# Patient Record
Sex: FEMALE | Race: WHITE | NOT HISPANIC OR LATINO
[De-identification: names, ages, dates, MRNs, and addresses within clinical notes are randomized per-mention and may not be internally consistent; named-entity substitution may affect disease eponyms.]

---

## 2017-01-23 ENCOUNTER — RX RENEWAL (OUTPATIENT)
Age: 72
End: 2017-01-23

## 2017-04-17 ENCOUNTER — APPOINTMENT (OUTPATIENT)
Dept: FAMILY MEDICINE | Facility: CLINIC | Age: 72
End: 2017-04-17

## 2017-04-17 ENCOUNTER — LABORATORY RESULT (OUTPATIENT)
Age: 72
End: 2017-04-17

## 2017-04-17 VITALS
DIASTOLIC BLOOD PRESSURE: 82 MMHG | BODY MASS INDEX: 34.1 KG/M2 | SYSTOLIC BLOOD PRESSURE: 124 MMHG | WEIGHT: 225 LBS | HEIGHT: 68 IN

## 2017-04-18 LAB
ALBUMIN SERPL ELPH-MCNC: 4.3 G/DL
ALP BLD-CCNC: 72 U/L
ALT SERPL-CCNC: 20 U/L
ANION GAP SERPL CALC-SCNC: 15 MMOL/L
AST SERPL-CCNC: 26 U/L
B BURGDOR IGG+IGM SER QL IB: NORMAL
BILIRUB SERPL-MCNC: 0.3 MG/DL
BUN SERPL-MCNC: 16 MG/DL
CALCIUM SERPL-MCNC: 9.7 MG/DL
CHLORIDE SERPL-SCNC: 102 MMOL/L
CHOLEST SERPL-MCNC: 188 MG/DL
CHOLEST/HDLC SERPL: 2.9 RATIO
CO2 SERPL-SCNC: 25 MMOL/L
CREAT SERPL-MCNC: 0.93 MG/DL
GLUCOSE SERPL-MCNC: 93 MG/DL
HBA1C MFR BLD HPLC: 5.6 %
HDLC SERPL-MCNC: 65 MG/DL
LDLC SERPL CALC-MCNC: 106 MG/DL
POTASSIUM SERPL-SCNC: 4.4 MMOL/L
PROT SERPL-MCNC: 6.9 G/DL
SODIUM SERPL-SCNC: 142 MMOL/L
TRIGL SERPL-MCNC: 85 MG/DL
TSH SERPL-ACNC: 1.14 UIU/ML

## 2017-04-24 ENCOUNTER — RX RENEWAL (OUTPATIENT)
Age: 72
End: 2017-04-24

## 2017-07-03 ENCOUNTER — RX RENEWAL (OUTPATIENT)
Age: 72
End: 2017-07-03

## 2017-07-06 ENCOUNTER — RX RENEWAL (OUTPATIENT)
Age: 72
End: 2017-07-06

## 2017-11-02 ENCOUNTER — RX RENEWAL (OUTPATIENT)
Age: 72
End: 2017-11-02

## 2018-01-18 ENCOUNTER — RX RENEWAL (OUTPATIENT)
Age: 73
End: 2018-01-18

## 2018-02-14 ENCOUNTER — RX RENEWAL (OUTPATIENT)
Age: 73
End: 2018-02-14

## 2018-02-15 ENCOUNTER — RX RENEWAL (OUTPATIENT)
Age: 73
End: 2018-02-15

## 2018-04-20 ENCOUNTER — RX RENEWAL (OUTPATIENT)
Age: 73
End: 2018-04-20

## 2018-05-08 ENCOUNTER — LABORATORY RESULT (OUTPATIENT)
Age: 73
End: 2018-05-08

## 2018-05-08 ENCOUNTER — APPOINTMENT (OUTPATIENT)
Dept: FAMILY MEDICINE | Facility: CLINIC | Age: 73
End: 2018-05-08
Payer: MEDICARE

## 2018-05-08 ENCOUNTER — NON-APPOINTMENT (OUTPATIENT)
Age: 73
End: 2018-05-08

## 2018-05-08 VITALS
WEIGHT: 223 LBS | RESPIRATION RATE: 16 BRPM | DIASTOLIC BLOOD PRESSURE: 72 MMHG | HEART RATE: 58 BPM | HEIGHT: 68 IN | SYSTOLIC BLOOD PRESSURE: 126 MMHG | BODY MASS INDEX: 33.8 KG/M2 | OXYGEN SATURATION: 98 %

## 2018-05-08 PROCEDURE — 36415 COLL VENOUS BLD VENIPUNCTURE: CPT

## 2018-05-08 PROCEDURE — G0439: CPT

## 2018-05-08 PROCEDURE — 93000 ELECTROCARDIOGRAM COMPLETE: CPT | Mod: 59

## 2018-05-09 LAB
25(OH)D3 SERPL-MCNC: 66 NG/ML
ALBUMIN SERPL ELPH-MCNC: 4.3 G/DL
ALP BLD-CCNC: 90 U/L
ALT SERPL-CCNC: 23 U/L
AMYLASE/CREAT SERPL: 58 U/L
ANION GAP SERPL CALC-SCNC: 16 MMOL/L
AST SERPL-CCNC: 24 U/L
B BURGDOR IGG+IGM SER QL IB: NORMAL
BASOPHILS # BLD AUTO: 0.02 K/UL
BASOPHILS NFR BLD AUTO: 0.2 %
BILIRUB SERPL-MCNC: 0.4 MG/DL
BUN SERPL-MCNC: 24 MG/DL
CALCIUM SERPL-MCNC: 10.9 MG/DL
CHLORIDE SERPL-SCNC: 98 MMOL/L
CHOLEST SERPL-MCNC: 208 MG/DL
CHOLEST/HDLC SERPL: 4.2 RATIO
CO2 SERPL-SCNC: 25 MMOL/L
CREAT SERPL-MCNC: 1.14 MG/DL
EOSINOPHIL # BLD AUTO: 0.28 K/UL
EOSINOPHIL NFR BLD AUTO: 2.8 %
GLUCOSE SERPL-MCNC: 87 MG/DL
HBA1C MFR BLD HPLC: 5.5 %
HCT VFR BLD CALC: 41.8 %
HDLC SERPL-MCNC: 50 MG/DL
HGB BLD-MCNC: 13.5 G/DL
IMM GRANULOCYTES NFR BLD AUTO: 0.3 %
LDLC SERPL CALC-MCNC: 124 MG/DL
LPL SERPL-CCNC: 24 U/L
LYMPHOCYTES # BLD AUTO: 1.86 K/UL
LYMPHOCYTES NFR BLD AUTO: 18.9 %
MAN DIFF?: NORMAL
MCHC RBC-ENTMCNC: 31.5 PG
MCHC RBC-ENTMCNC: 32.3 GM/DL
MCV RBC AUTO: 97.7 FL
MONOCYTES # BLD AUTO: 0.48 K/UL
MONOCYTES NFR BLD AUTO: 4.9 %
NEUTROPHILS # BLD AUTO: 7.17 K/UL
NEUTROPHILS NFR BLD AUTO: 72.9 %
PLATELET # BLD AUTO: 233 K/UL
POTASSIUM SERPL-SCNC: 4.6 MMOL/L
PROT SERPL-MCNC: 7.3 G/DL
RBC # BLD: 4.28 M/UL
RBC # FLD: 13.5 %
SODIUM SERPL-SCNC: 139 MMOL/L
T4 SERPL-MCNC: 7.8 UG/DL
TRIGL SERPL-MCNC: 172 MG/DL
TSH SERPL-ACNC: 1.46 UIU/ML
URATE SERPL-MCNC: 5.4 MG/DL
VIT B12 SERPL-MCNC: 825 PG/ML
WBC # FLD AUTO: 9.84 K/UL

## 2018-05-11 ENCOUNTER — APPOINTMENT (OUTPATIENT)
Dept: FAMILY MEDICINE | Facility: CLINIC | Age: 73
End: 2018-05-11

## 2018-06-04 ENCOUNTER — OUTPATIENT (OUTPATIENT)
Dept: OUTPATIENT SERVICES | Facility: HOSPITAL | Age: 73
LOS: 1 days | End: 2018-06-04
Payer: MEDICARE

## 2018-06-04 ENCOUNTER — OUTPATIENT (OUTPATIENT)
Dept: OUTPATIENT SERVICES | Facility: HOSPITAL | Age: 73
LOS: 1 days | End: 2018-06-04
Payer: COMMERCIAL

## 2018-06-04 DIAGNOSIS — Z22.321 CARRIER OR SUSPECTED CARRIER OF METHICILLIN SUSCEPTIBLE STAPHYLOCOCCUS AUREUS: ICD-10-CM

## 2018-06-04 PROCEDURE — 87641 MR-STAPH DNA AMP PROBE: CPT

## 2018-06-04 PROCEDURE — 71046 X-RAY EXAM CHEST 2 VIEWS: CPT

## 2018-06-04 PROCEDURE — 71046 X-RAY EXAM CHEST 2 VIEWS: CPT | Mod: 26

## 2018-06-05 LAB
MRSA PCR RESULT.: POSITIVE
S AUREUS DNA NOSE QL NAA+PROBE: POSITIVE

## 2018-06-18 ENCOUNTER — APPOINTMENT (OUTPATIENT)
Dept: FAMILY MEDICINE | Facility: CLINIC | Age: 73
End: 2018-06-18
Payer: MEDICARE

## 2018-06-18 ENCOUNTER — TRANSCRIPTION ENCOUNTER (OUTPATIENT)
Age: 73
End: 2018-06-18

## 2018-06-18 ENCOUNTER — NON-APPOINTMENT (OUTPATIENT)
Age: 73
End: 2018-06-18

## 2018-06-18 VITALS
DIASTOLIC BLOOD PRESSURE: 80 MMHG | HEIGHT: 68 IN | BODY MASS INDEX: 33.87 KG/M2 | SYSTOLIC BLOOD PRESSURE: 124 MMHG | WEIGHT: 223.5 LBS

## 2018-06-18 DIAGNOSIS — Z13.220 ENCOUNTER FOR SCREENING FOR LIPOID DISORDERS: ICD-10-CM

## 2018-06-18 DIAGNOSIS — Z87.39 PERSONAL HISTORY OF OTHER DISEASES OF THE MUSCULOSKELETAL SYSTEM AND CONNECTIVE TISSUE: ICD-10-CM

## 2018-06-18 DIAGNOSIS — Z13.0 ENCOUNTER FOR SCREENING FOR OTHER SUSPECTED ENDOCRINE DISORDER: ICD-10-CM

## 2018-06-18 DIAGNOSIS — Z87.09 PERSONAL HISTORY OF OTHER DISEASES OF THE RESPIRATORY SYSTEM: ICD-10-CM

## 2018-06-18 DIAGNOSIS — Z13.1 ENCOUNTER FOR SCREENING FOR DIABETES MELLITUS: ICD-10-CM

## 2018-06-18 DIAGNOSIS — Z13.29 ENCOUNTER FOR SCREENING FOR OTHER SUSPECTED ENDOCRINE DISORDER: ICD-10-CM

## 2018-06-18 DIAGNOSIS — Z13.228 ENCOUNTER FOR SCREENING FOR OTHER SUSPECTED ENDOCRINE DISORDER: ICD-10-CM

## 2018-06-18 DIAGNOSIS — Z92.29 PERSONAL HISTORY OF OTHER DRUG THERAPY: ICD-10-CM

## 2018-06-18 DIAGNOSIS — R79.89 OTHER SPECIFIED ABNORMAL FINDINGS OF BLOOD CHEMISTRY: ICD-10-CM

## 2018-06-18 PROCEDURE — 99214 OFFICE O/P EST MOD 30 MIN: CPT | Mod: 25

## 2018-06-18 PROCEDURE — 36415 COLL VENOUS BLD VENIPUNCTURE: CPT

## 2018-06-18 PROCEDURE — 93000 ELECTROCARDIOGRAM COMPLETE: CPT

## 2018-06-18 RX ORDER — MULTIVITAMIN
TABLET ORAL
Refills: 0 | Status: ACTIVE | COMMUNITY

## 2018-06-18 NOTE — PHYSICAL EXAM
[No Acute Distress] : no acute distress [Well Developed] : well developed [Supple] : supple [Clear to Auscultation] : lungs were clear to auscultation bilaterally [Regular Rhythm] : with a regular rhythm [Normal S1, S2] : normal S1 and S2 [No Carotid Bruits] : no carotid bruits [Pedal Pulses Present] : the pedal pulses are present

## 2018-06-19 LAB
ALBUMIN SERPL ELPH-MCNC: 4.4 G/DL
ALP BLD-CCNC: 88 U/L
ALT SERPL-CCNC: 23 U/L
ANION GAP SERPL CALC-SCNC: 11 MMOL/L
APPEARANCE: CLEAR
APTT BLD: 32.4 SEC
AST SERPL-CCNC: 24 U/L
BASOPHILS # BLD AUTO: 0.01 K/UL
BASOPHILS NFR BLD AUTO: 0.1 %
BILIRUB SERPL-MCNC: 0.2 MG/DL
BILIRUBIN URINE: NEGATIVE
BLOOD URINE: NEGATIVE
BUN SERPL-MCNC: 21 MG/DL
CALCIUM SERPL-MCNC: 9.8 MG/DL
CHLORIDE SERPL-SCNC: 105 MMOL/L
CO2 SERPL-SCNC: 26 MMOL/L
COLOR: YELLOW
CREAT SERPL-MCNC: 0.93 MG/DL
EOSINOPHIL # BLD AUTO: 0.2 K/UL
EOSINOPHIL NFR BLD AUTO: 2.7 %
GLUCOSE QUALITATIVE U: NEGATIVE MG/DL
GLUCOSE SERPL-MCNC: 108 MG/DL
HCT VFR BLD CALC: 41.4 %
HGB BLD-MCNC: 13.1 G/DL
IMM GRANULOCYTES NFR BLD AUTO: 0.3 %
INR PPP: 0.98 RATIO
KETONES URINE: NEGATIVE
LEUKOCYTE ESTERASE URINE: NEGATIVE
LYMPHOCYTES # BLD AUTO: 1.8 K/UL
LYMPHOCYTES NFR BLD AUTO: 24 %
MAN DIFF?: NORMAL
MCHC RBC-ENTMCNC: 30.7 PG
MCHC RBC-ENTMCNC: 31.6 GM/DL
MCV RBC AUTO: 97 FL
MONOCYTES # BLD AUTO: 0.35 K/UL
MONOCYTES NFR BLD AUTO: 4.7 %
NEUTROPHILS # BLD AUTO: 5.13 K/UL
NEUTROPHILS NFR BLD AUTO: 68.2 %
NITRITE URINE: NEGATIVE
PH URINE: 6.5
PLATELET # BLD AUTO: 254 K/UL
POTASSIUM SERPL-SCNC: 4.9 MMOL/L
PROT SERPL-MCNC: 7.1 G/DL
PROTEIN URINE: NEGATIVE MG/DL
PT BLD: 11.1 SEC
RBC # BLD: 4.27 M/UL
RBC # FLD: 13.2 %
SODIUM SERPL-SCNC: 142 MMOL/L
SPECIFIC GRAVITY URINE: 1.01
UROBILINOGEN URINE: NEGATIVE MG/DL
WBC # FLD AUTO: 7.51 K/UL

## 2018-06-19 NOTE — HISTORY OF PRESENT ILLNESS
[( Patient denies any chest pain, claudication, dyspnea on exertion, orthopnea, palpitations or syncope )] : Patient denies any chest pain, claudication, dyspnea on exertion, orthopnea, palpitations or syncope. [Coronary Artery Disease] : no coronary artery disease [Diabetes] : no diabetes [Sleep Apnea] : no sleep apnea [COPD] : no COPD [FreeTextEntry1] : Right total knee replacement [FreeTextEntry2] : June 26 [FreeTextEntry3] : Danielle [FreeTextEntry4] : Chronic right knee pain. X-ray shows bone on bone.\par \par Patient exercises regularly without cardiovascular symptoms. She has never smoked. No history of cardiovascular disease. Patient exericses on recumbent bike for half hour without difficulty\par \par Patient recently had upper endoscopy, which showed gastric ulcer. Recently started on PPI

## 2018-06-19 NOTE — ASSESSMENT
[Patient Optimized for Surgery] : Patient optimized for surgery [Continue medications as is] : Continue current medications [FreeTextEntry4] : Patient has no cardiovascular contraindications to surgery.\par \par Given history of gastric ulcer should remain on PPI. Perioperatively and NSAIDs avoided.\par \par She is medically cleared for this procedure pending preop blood tests

## 2018-06-26 VITALS
DIASTOLIC BLOOD PRESSURE: 58 MMHG | RESPIRATION RATE: 18 BRPM | HEIGHT: 68 IN | HEART RATE: 62 BPM | OXYGEN SATURATION: 97 % | TEMPERATURE: 98 F | SYSTOLIC BLOOD PRESSURE: 127 MMHG | WEIGHT: 216.05 LBS

## 2018-06-26 NOTE — PATIENT PROFILE ADULT. - NS TRANSFER EYEGLASSES PAIRS
Get labs  Can try tylenol arthritis 650 mg every 6-8 hrs as needed for pain-if worse can try therapy   1 pair

## 2018-06-27 ENCOUNTER — INPATIENT (INPATIENT)
Facility: HOSPITAL | Age: 73
LOS: 5 days | Discharge: HOME HEALTH | DRG: 470 | End: 2018-07-03
Attending: ORTHOPAEDIC SURGERY | Admitting: ORTHOPAEDIC SURGERY
Payer: MEDICARE

## 2018-06-27 ENCOUNTER — RESULT REVIEW (OUTPATIENT)
Age: 73
End: 2018-06-27

## 2018-06-27 DIAGNOSIS — E78.5 HYPERLIPIDEMIA, UNSPECIFIED: ICD-10-CM

## 2018-06-27 DIAGNOSIS — M19.90 UNSPECIFIED OSTEOARTHRITIS, UNSPECIFIED SITE: ICD-10-CM

## 2018-06-27 DIAGNOSIS — G43.909 MIGRAINE, UNSPECIFIED, NOT INTRACTABLE, WITHOUT STATUS MIGRAINOSUS: ICD-10-CM

## 2018-06-27 DIAGNOSIS — M24.571 CONTRACTURE, RIGHT ANKLE: Chronic | ICD-10-CM

## 2018-06-27 PROCEDURE — 73560 X-RAY EXAM OF KNEE 1 OR 2: CPT | Mod: 26,RT

## 2018-06-27 RX ORDER — KETOROLAC TROMETHAMINE 30 MG/ML
15 SYRINGE (ML) INJECTION EVERY 6 HOURS
Qty: 0 | Refills: 0 | Status: DISCONTINUED | OUTPATIENT
Start: 2018-06-27 | End: 2018-07-02

## 2018-06-27 RX ORDER — BUPIVACAINE 13.3 MG/ML
20 INJECTION, SUSPENSION, LIPOSOMAL INFILTRATION ONCE
Qty: 0 | Refills: 0 | Status: DISCONTINUED | OUTPATIENT
Start: 2018-06-27 | End: 2018-07-03

## 2018-06-27 RX ORDER — ACETAMINOPHEN 500 MG
650 TABLET ORAL EVERY 6 HOURS
Qty: 0 | Refills: 0 | Status: DISCONTINUED | OUTPATIENT
Start: 2018-06-27 | End: 2018-07-03

## 2018-06-27 RX ORDER — PANTOPRAZOLE SODIUM 20 MG/1
40 TABLET, DELAYED RELEASE ORAL DAILY
Qty: 0 | Refills: 0 | Status: DISCONTINUED | OUTPATIENT
Start: 2018-06-27 | End: 2018-07-03

## 2018-06-27 RX ORDER — DEXLANSOPRAZOLE 30 MG/1
1 CAPSULE, DELAYED RELEASE ORAL
Qty: 0 | Refills: 0 | COMMUNITY

## 2018-06-27 RX ORDER — MORPHINE SULFATE 50 MG/1
4 CAPSULE, EXTENDED RELEASE ORAL EVERY 4 HOURS
Qty: 0 | Refills: 0 | Status: DISCONTINUED | OUTPATIENT
Start: 2018-06-27 | End: 2018-06-28

## 2018-06-27 RX ORDER — POLYETHYLENE GLYCOL 3350 17 G/17G
17 POWDER, FOR SOLUTION ORAL DAILY
Qty: 0 | Refills: 0 | Status: DISCONTINUED | OUTPATIENT
Start: 2018-06-27 | End: 2018-07-03

## 2018-06-27 RX ORDER — CELECOXIB 200 MG/1
200 CAPSULE ORAL
Qty: 0 | Refills: 0 | Status: DISCONTINUED | OUTPATIENT
Start: 2018-06-27 | End: 2018-07-02

## 2018-06-27 RX ORDER — OXYCODONE HYDROCHLORIDE 5 MG/1
10 TABLET ORAL EVERY 4 HOURS
Qty: 0 | Refills: 0 | Status: DISCONTINUED | OUTPATIENT
Start: 2018-06-27 | End: 2018-06-28

## 2018-06-27 RX ORDER — SENNA PLUS 8.6 MG/1
2 TABLET ORAL AT BEDTIME
Qty: 0 | Refills: 0 | Status: DISCONTINUED | OUTPATIENT
Start: 2018-06-27 | End: 2018-07-03

## 2018-06-27 RX ORDER — ATORVASTATIN CALCIUM 80 MG/1
1 TABLET, FILM COATED ORAL
Qty: 0 | Refills: 0 | COMMUNITY

## 2018-06-27 RX ORDER — CEFAZOLIN SODIUM 1 G
2000 VIAL (EA) INJECTION EVERY 8 HOURS
Qty: 0 | Refills: 0 | Status: COMPLETED | OUTPATIENT
Start: 2018-06-27 | End: 2018-06-28

## 2018-06-27 RX ORDER — MAGNESIUM HYDROXIDE 400 MG/1
30 TABLET, CHEWABLE ORAL DAILY
Qty: 0 | Refills: 0 | Status: DISCONTINUED | OUTPATIENT
Start: 2018-06-27 | End: 2018-07-03

## 2018-06-27 RX ORDER — ONDANSETRON 8 MG/1
4 TABLET, FILM COATED ORAL EVERY 6 HOURS
Qty: 0 | Refills: 0 | Status: DISCONTINUED | OUTPATIENT
Start: 2018-06-27 | End: 2018-07-03

## 2018-06-27 RX ORDER — SODIUM CHLORIDE 9 MG/ML
1000 INJECTION, SOLUTION INTRAVENOUS
Qty: 0 | Refills: 0 | Status: DISCONTINUED | OUTPATIENT
Start: 2018-06-27 | End: 2018-06-28

## 2018-06-27 RX ORDER — DOCUSATE SODIUM 100 MG
100 CAPSULE ORAL THREE TIMES A DAY
Qty: 0 | Refills: 0 | Status: DISCONTINUED | OUTPATIENT
Start: 2018-06-27 | End: 2018-07-03

## 2018-06-27 RX ORDER — OXYCODONE HYDROCHLORIDE 5 MG/1
5 TABLET ORAL EVERY 4 HOURS
Qty: 0 | Refills: 0 | Status: DISCONTINUED | OUTPATIENT
Start: 2018-06-27 | End: 2018-06-28

## 2018-06-27 RX ORDER — ASPIRIN/CALCIUM CARB/MAGNESIUM 324 MG
325 TABLET ORAL
Qty: 0 | Refills: 0 | Status: DISCONTINUED | OUTPATIENT
Start: 2018-06-27 | End: 2018-06-30

## 2018-06-27 RX ADMIN — Medication 15 MILLIGRAM(S): at 20:05

## 2018-06-27 RX ADMIN — Medication 325 MILLIGRAM(S): at 21:19

## 2018-06-27 RX ADMIN — MORPHINE SULFATE 4 MILLIGRAM(S): 50 CAPSULE, EXTENDED RELEASE ORAL at 22:30

## 2018-06-27 RX ADMIN — OXYCODONE HYDROCHLORIDE 5 MILLIGRAM(S): 5 TABLET ORAL at 20:00

## 2018-06-27 RX ADMIN — MORPHINE SULFATE 4 MILLIGRAM(S): 50 CAPSULE, EXTENDED RELEASE ORAL at 22:45

## 2018-06-27 RX ADMIN — Medication 15 MILLIGRAM(S): at 21:06

## 2018-06-27 RX ADMIN — OXYCODONE HYDROCHLORIDE 5 MILLIGRAM(S): 5 TABLET ORAL at 21:06

## 2018-06-27 RX ADMIN — Medication 100 MILLIGRAM(S): at 21:19

## 2018-06-27 NOTE — H&P ADULT - NSHPLABSRESULTS_GEN_ALL_CORE
preop cbc/bmp/coags/ua wnl per medical clearance   EKG- SINUS BRADYCARDIA, 58BPM  CXR- LUNGS CLEAR  NARES + MSSA AND MRSA preop cbc/bmp/coags/ua wnl per medical clearance   EKG- SINUS BRADYCARDIA, 58BPM  CXR- LUNGS CLEAR  NARES + MSSA AND MRSA (did 5d of mupirocin)

## 2018-06-27 NOTE — H&P ADULT - NSHPPHYSICALEXAM_GEN_ALL_CORE
GENERAL-  MSK  SLT INTACT, DP/PT 2+, EHL/TA/GS  Decreased ROM secondary to pain. Rest of PE per medical clearance. GENERAL-NAD   MSK- right knee + ttp along medial joint line, skin intact no erythema/ecchymosis/sts,  SLT INTACT, DP/PT 2+, EHL/TA/GS 5/5,.  Decreased ROM secondary to pain. Rest of PE per medical clearance.

## 2018-06-27 NOTE — H&P ADULT - HISTORY OF PRESENT ILLNESS
74yo f c/o right knee pain x   Presents today for elective RIGHT TKR. 74yo f c/o right knee pain less than 1 y. Pt. c/o pain in right knee without radiation. Pt. reports pain is worsened when going up and down stairs and getting up.  Pt. has been using recumbant bike since January per Dr. Ramirez request. Pt. takes Meloxicam for pain relief. Pt. also has done 3 PRP injections to alleviate pain. Pt. states she has no problem walking though right knee does give out on her at times ( no falls). Denies any numbness/tingling in b/l les.   Presents today for elective RIGHT TKR.

## 2018-06-28 ENCOUNTER — TRANSCRIPTION ENCOUNTER (OUTPATIENT)
Age: 73
End: 2018-06-28

## 2018-06-28 LAB
ANION GAP SERPL CALC-SCNC: 10 MMOL/L — SIGNIFICANT CHANGE UP (ref 5–17)
BUN SERPL-MCNC: 19 MG/DL — SIGNIFICANT CHANGE UP (ref 7–23)
CALCIUM SERPL-MCNC: 8.5 MG/DL — SIGNIFICANT CHANGE UP (ref 8.4–10.5)
CHLORIDE SERPL-SCNC: 102 MMOL/L — SIGNIFICANT CHANGE UP (ref 96–108)
CO2 SERPL-SCNC: 26 MMOL/L — SIGNIFICANT CHANGE UP (ref 22–31)
CREAT SERPL-MCNC: 0.81 MG/DL — SIGNIFICANT CHANGE UP (ref 0.5–1.3)
GLUCOSE SERPL-MCNC: 131 MG/DL — HIGH (ref 70–99)
HCT VFR BLD CALC: 34 % — LOW (ref 34.5–45)
HGB BLD-MCNC: 11.2 G/DL — LOW (ref 11.5–15.5)
MCHC RBC-ENTMCNC: 31.2 PG — SIGNIFICANT CHANGE UP (ref 27–34)
MCHC RBC-ENTMCNC: 32.9 G/DL — SIGNIFICANT CHANGE UP (ref 32–36)
MCV RBC AUTO: 94.7 FL — SIGNIFICANT CHANGE UP (ref 80–100)
PLATELET # BLD AUTO: 202 K/UL — SIGNIFICANT CHANGE UP (ref 150–400)
POTASSIUM SERPL-MCNC: 3.9 MMOL/L — SIGNIFICANT CHANGE UP (ref 3.5–5.3)
POTASSIUM SERPL-SCNC: 3.9 MMOL/L — SIGNIFICANT CHANGE UP (ref 3.5–5.3)
RBC # BLD: 3.59 M/UL — LOW (ref 3.8–5.2)
RBC # FLD: 12.6 % — SIGNIFICANT CHANGE UP (ref 10.3–16.9)
SODIUM SERPL-SCNC: 138 MMOL/L — SIGNIFICANT CHANGE UP (ref 135–145)
SURGICAL PATHOLOGY STUDY: SIGNIFICANT CHANGE UP
WBC # BLD: 12 K/UL — HIGH (ref 3.8–10.5)
WBC # FLD AUTO: 12 K/UL — HIGH (ref 3.8–10.5)

## 2018-06-28 RX ORDER — ACETAMINOPHEN 500 MG
975 TABLET ORAL EVERY 8 HOURS
Qty: 0 | Refills: 0 | Status: DISCONTINUED | OUTPATIENT
Start: 2018-06-28 | End: 2018-07-03

## 2018-06-28 RX ORDER — SENNA PLUS 8.6 MG/1
2 TABLET ORAL
Qty: 0 | Refills: 0 | COMMUNITY
Start: 2018-06-28

## 2018-06-28 RX ORDER — SUMATRIPTAN SUCCINATE 4 MG/.5ML
25 INJECTION, SOLUTION SUBCUTANEOUS ONCE
Qty: 0 | Refills: 0 | Status: DISCONTINUED | OUTPATIENT
Start: 2018-06-28 | End: 2018-07-03

## 2018-06-28 RX ORDER — POLYETHYLENE GLYCOL 3350 17 G/17G
17 POWDER, FOR SOLUTION ORAL
Qty: 0 | Refills: 0 | COMMUNITY
Start: 2018-06-28

## 2018-06-28 RX ORDER — HYDROMORPHONE HYDROCHLORIDE 2 MG/ML
0.5 INJECTION INTRAMUSCULAR; INTRAVENOUS; SUBCUTANEOUS EVERY 4 HOURS
Qty: 0 | Refills: 0 | Status: DISCONTINUED | OUTPATIENT
Start: 2018-06-28 | End: 2018-07-03

## 2018-06-28 RX ORDER — OXYCODONE HYDROCHLORIDE 5 MG/1
5 TABLET ORAL EVERY 4 HOURS
Qty: 0 | Refills: 0 | Status: DISCONTINUED | OUTPATIENT
Start: 2018-06-28 | End: 2018-07-03

## 2018-06-28 RX ORDER — DOCUSATE SODIUM 100 MG
1 CAPSULE ORAL
Qty: 0 | Refills: 0 | COMMUNITY
Start: 2018-06-28

## 2018-06-28 RX ORDER — OXYCODONE HYDROCHLORIDE 5 MG/1
10 TABLET ORAL EVERY 6 HOURS
Qty: 0 | Refills: 0 | Status: DISCONTINUED | OUTPATIENT
Start: 2018-06-28 | End: 2018-07-03

## 2018-06-28 RX ADMIN — Medication 325 MILLIGRAM(S): at 17:46

## 2018-06-28 RX ADMIN — Medication 975 MILLIGRAM(S): at 21:08

## 2018-06-28 RX ADMIN — Medication 100 MILLIGRAM(S): at 21:08

## 2018-06-28 RX ADMIN — OXYCODONE HYDROCHLORIDE 10 MILLIGRAM(S): 5 TABLET ORAL at 00:23

## 2018-06-28 RX ADMIN — Medication 975 MILLIGRAM(S): at 13:44

## 2018-06-28 RX ADMIN — Medication 325 MILLIGRAM(S): at 04:50

## 2018-06-28 RX ADMIN — OXYCODONE HYDROCHLORIDE 10 MILLIGRAM(S): 5 TABLET ORAL at 04:50

## 2018-06-28 RX ADMIN — OXYCODONE HYDROCHLORIDE 10 MILLIGRAM(S): 5 TABLET ORAL at 15:05

## 2018-06-28 RX ADMIN — Medication 650 MILLIGRAM(S): at 04:50

## 2018-06-28 RX ADMIN — OXYCODONE HYDROCHLORIDE 10 MILLIGRAM(S): 5 TABLET ORAL at 14:05

## 2018-06-28 RX ADMIN — OXYCODONE HYDROCHLORIDE 10 MILLIGRAM(S): 5 TABLET ORAL at 21:08

## 2018-06-28 RX ADMIN — OXYCODONE HYDROCHLORIDE 10 MILLIGRAM(S): 5 TABLET ORAL at 11:04

## 2018-06-28 RX ADMIN — Medication 100 MILLIGRAM(S): at 04:50

## 2018-06-28 RX ADMIN — Medication 975 MILLIGRAM(S): at 14:44

## 2018-06-28 RX ADMIN — OXYCODONE HYDROCHLORIDE 10 MILLIGRAM(S): 5 TABLET ORAL at 05:50

## 2018-06-28 RX ADMIN — OXYCODONE HYDROCHLORIDE 10 MILLIGRAM(S): 5 TABLET ORAL at 22:08

## 2018-06-28 RX ADMIN — Medication 975 MILLIGRAM(S): at 22:08

## 2018-06-28 RX ADMIN — POLYETHYLENE GLYCOL 3350 17 GRAM(S): 17 POWDER, FOR SOLUTION ORAL at 12:22

## 2018-06-28 RX ADMIN — OXYCODONE HYDROCHLORIDE 10 MILLIGRAM(S): 5 TABLET ORAL at 10:04

## 2018-06-28 RX ADMIN — ONDANSETRON 4 MILLIGRAM(S): 8 TABLET, FILM COATED ORAL at 11:33

## 2018-06-28 RX ADMIN — PANTOPRAZOLE SODIUM 40 MILLIGRAM(S): 20 TABLET, DELAYED RELEASE ORAL at 12:22

## 2018-06-28 RX ADMIN — OXYCODONE HYDROCHLORIDE 10 MILLIGRAM(S): 5 TABLET ORAL at 01:15

## 2018-06-28 RX ADMIN — Medication 100 MILLIGRAM(S): at 13:44

## 2018-06-28 RX ADMIN — Medication 100 MILLIGRAM(S): at 04:51

## 2018-06-28 NOTE — DISCHARGE NOTE ADULT - MEDICATION SUMMARY - MEDICATIONS TO STOP TAKING
I will STOP taking the medications listed below when I get home from the hospital:    meloxicam 7.5 mg oral tablet

## 2018-06-28 NOTE — DISCHARGE NOTE ADULT - ADDITIONAL INSTRUCTIONS
Follow up with your primary care provider You are diagnosed with bilateral lung pulmonary embolism. You must follow up with pulmonologist Dr. Salazar on July 10th. See below for contact details.

## 2018-06-28 NOTE — DISCHARGE NOTE ADULT - PLAN OF CARE
Improved ambulation and decreased pain after R TKA Weight bearing as tolerated on right leg with rolling walker  No strenuous activity, heavy lifting, driving, tub bathing, or returning to work until cleared by MD.  You may shower--dressing is waterproof.  Remove dressing after post op day 7, then leave incision open to air.  Follow up with Dr. Santos to schedule an appt within 10-14 days.  If you don't have a bowel movement by post op day 3, then take Milk of Magnesia (over the counter).  If no bowel movement by at least post op day 5, then use a Dulcolax suppository (over the counter) and/or a Fleets enema--if still no bowel movement, call your MD.  Contact your doctor if you experience: fever greater than 101.5, chills, chest pain, difficulty breathing, bleeding, redness or heat around the incision.

## 2018-06-28 NOTE — PHYSICAL THERAPY INITIAL EVALUATION ADULT - PERTINENT HX OF CURRENT PROBLEM, REHAB EVAL
Patient is a 72 y/o F with chief c/o chronic knee pain progressively worsening to impact function. Patient presents for elective TKR.

## 2018-06-28 NOTE — PROGRESS NOTE ADULT - SUBJECTIVE AND OBJECTIVE BOX
ORTHO NOTE    [x ] Pt seen/examined.  [ ] Pt without any complaints/in NAD.    [x ] Pt complains of: nausea      ROS: [ ] Fever  [ ] Chills  [ ] CP [ ] SOB [ ] Dysnea  [ ] Palpitations [ ] Cough [ ] N/V/C/D [ ] Paresthia [ ] Other     [x] ROS  otherwise negative    .    PHYSICAL EXAM:    Vital Signs Last 24 Hrs  T(C): 37.3 (28 Jun 2018 08:23), Max: 37.9 (28 Jun 2018 04:49)  T(F): 99.2 (28 Jun 2018 08:23), Max: 100.2 (28 Jun 2018 04:49)  HR: 63 (28 Jun 2018 08:23) (42 - 72)  BP: 109/68 (28 Jun 2018 08:23) (109/68 - 152/68)  BP(mean): --  RR: 17 (28 Jun 2018 08:23) (12 - 34)  SpO2: 96% (28 Jun 2018 08:23) (95% - 99%)    I&O's Detail    27 Jun 2018 07:01  -  28 Jun 2018 07:00  --------------------------------------------------------  IN:    lactated ringers.: 1820 mL    Oral Fluid: 420 mL  Total IN: 2240 mL    OUT:    Voided: 650 mL  Total OUT: 650 mL    Total NET: 1590 mL      28 Jun 2018 07:01  -  28 Jun 2018 12:53  --------------------------------------------------------  IN:    Oral Fluid: 480 mL  Total IN: 480 mL    OUT:    Voided: 500 mL  Total OUT: 500 mL    Total NET: -20 mL           CAPILLARY BLOOD GLUCOSE                      Neuro: AAOX3    Lungs: CTA, IS demonstrated    CV:    ABD: soft, nontender    Ext: R LE bulky Dressing: C/D/I  Motor: 5/5 GS/TA/EHL/FHL    Sensation: SILT s/sp/dp/tib  Pulses:  DP/PT 2+ , toes/foot WWP    LABS                        11.2   12.0  )-----------( 202      ( 28 Jun 2018 07:59 )             34.0                                06-28    138  |  102  |  19  ----------------------------<  131<H>  3.9   |  26  |  0.81    Ca    8.5      28 Jun 2018 07:59        [ ] Other Labs  [ ] None ordered            Please check or [place] when present:  •  Heart Failure:    [ ] Acute        [ ]  Acute on Chronic        [ ] Chronic         [ ] Diastolic     [ ]  Combined    •  JONATHAN:     [ ] ATN        [ ]  Renal medullary necrosis       [ ]  Renal cortical necrosis                  [ ] Other pathological Lesion:  •  CKD:  [ ] Stage I   [ ] Stage II  [ ] Stage III    [ ]Stage IV   [ ]  CKD V   [ ]  Other/Unspecified:    •  Abdominal Nutritional Status:   [ ] Malnutrition-See Nutrition note    [ ] Cachexia   [ ]  Other        [ ] Supplement ordered:            [ ] Morbid Obesity: BMI >=40         ASSESSMENT/PLAN:      STATUS POST: R TKA pod1  h/h stable  pain control- added non-narcotics  bowel regimen  antiemetics for nausea    CONTINUE:          [ ] PT- wbat    [ ] DVT PPX- scd    [ ] Pain Mgt- acetaminophen 975mg q 8, oxycodone 5-10mg q 4, toradol 15mg q 6 x6 doses, celebrex 200mg bid    [ ] Dispo plan- home, HPT

## 2018-06-28 NOTE — DISCHARGE NOTE ADULT - PATIENT PORTAL LINK FT
You can access the [facility] Patient Portal, offered by [facility], by registering with the following website: [email or web address]

## 2018-06-28 NOTE — PROGRESS NOTE ADULT - SUBJECTIVE AND OBJECTIVE BOX
Ortho Progress Note    Subjective:  73y Female s/p R TKA , POD# 1. Patient seen and examined, doing well, pain endorsed but controlled. No acute events overnight. Denies CP/SOB/N/V      Objective:    Vital Signs Last 24 Hrs  T(C): 37.9 (28 Jun 2018 04:49), Max: 37.9 (28 Jun 2018 04:49)  T(F): 100.2 (28 Jun 2018 04:49), Max: 100.2 (28 Jun 2018 04:49)  HR: 72 (28 Jun 2018 04:49) (42 - 72)  BP: 117/66 (28 Jun 2018 04:49) (116/75 - 152/68)  BP(mean): --  RR: 16 (28 Jun 2018 04:49) (12 - 34)  SpO2: 95% (28 Jun 2018 04:49) (95% - 99%)    NAD, AOx3, comfortable    RLE  Dressing: C/D/I  Motor: 5/5 GS/TA/EHL/FHL    Sensation: SILT s/sp/dp/tib  Pulses:  DP/PT 2+ , toes/foot WWP            A/P:  73y Female s/p above procedure , POD#   1  -Stable  -Pain Control  -PT/WBAT  -DVT ppx:  BID / SCDs  -Advance diet as tolerated  -f/u AM labs  -Dispo: home v rehab      Johnathan Street MD, PGY-1  [phone]

## 2018-06-28 NOTE — PROGRESS NOTE ADULT - MUSCULOSKELETAL
detailed exam Right lower extremity distal to right knee/decreased ROM due to pain/diminished strength/decreased ROM details…

## 2018-06-28 NOTE — DISCHARGE NOTE ADULT - CARE PROVIDER_API CALL
Nicola Santos), Orthopaedic Surgery  [address]  [address] [place] [address]  Phone: [phone]  Fax: [phone] Nicola Santos), Orthopaedic Surgery  [address]  [address] [place] [address]  Phone: [phone]  Fax: [phone]    Nicolle Salazar), Critical Care Medicine; Pulmonary Disease  [address]  [address]  [place] [address]  Phone: [phone]  Fax: [phone]

## 2018-06-28 NOTE — PHYSICAL THERAPY INITIAL EVALUATION ADULT - CRITERIA FOR SKILLED THERAPEUTIC INTERVENTIONS
predicted duration of therapy intervention/anticipated discharge recommendation/risk reduction/prevention/rehab potential/anticipated equipment needs at discharge/functional limitations in following categories/impairments found/therapy frequency

## 2018-06-28 NOTE — PROGRESS NOTE ADULT - SUBJECTIVE AND OBJECTIVE BOX
Patient seen at bedside with ortho Carmela ANN at 3:15pm.   Patient laying in bed, AAOX3.  She reports no pain with complete rest.  Pain ranges 5 to 6 out of 10 on scale of 0 to 10.    Pain localized to surgical site, with bed mobility, standing and ambulation with rolling walker.   Patient appears motivated to ambulate, especially to use bathroom.   She does complain of poor appetite, nausea and constipation.    On review of MAR, over last 15hrs she received total 40mg of oxycodone 10mg tabs, with moderate relief of her pain.     VITALS REVIEWED

## 2018-06-28 NOTE — PHYSICAL THERAPY INITIAL EVALUATION ADULT - IMPAIRMENTS FOUND, PT EVAL
ergonomics and body mechanics/gait, locomotion, and balance/aerobic capacity/endurance/muscle strength/ROM/posture

## 2018-06-28 NOTE — DISCHARGE NOTE ADULT - CARE PLAN
Principal Discharge DX:	OA (osteoarthritis)  Goal:	Improved ambulation and decreased pain after R TKA  Assessment and plan of treatment:	Weight bearing as tolerated on right leg with rolling walker  No strenuous activity, heavy lifting, driving, tub bathing, or returning to work until cleared by MD.  You may shower--dressing is waterproof.  Remove dressing after post op day 7, then leave incision open to air.  Follow up with Dr. Santos to schedule an appt within 10-14 days.  If you don't have a bowel movement by post op day 3, then take Milk of Magnesia (over the counter).  If no bowel movement by at least post op day 5, then use a Dulcolax suppository (over the counter) and/or a Fleets enema--if still no bowel movement, call your MD.  Contact your doctor if you experience: fever greater than 101.5, chills, chest pain, difficulty breathing, bleeding, redness or heat around the incision.

## 2018-06-28 NOTE — PHYSICAL THERAPY INITIAL EVALUATION ADULT - ADDITIONAL COMMENTS
Patient lives in home with 1 flight to enter with . As per patient  is handicapped and won't be able to assist very much. Patient endorses no DME use PTA.

## 2018-06-28 NOTE — DISCHARGE NOTE ADULT - MEDICATION SUMMARY - MEDICATIONS TO TAKE
I will START or STAY ON the medications listed below when I get home from the hospital:    oxyCODONE-acetaminophen 5 mg-325 mg oral tablet  -- Take 1 to 2 tab(s) by mouth every 4 to 6 hours as needed for pain. MDD:6    -- Caution federal law prohibits the transfer of this drug to any person other  than the person for whom it was prescribed.  May cause drowsiness.  Alcohol may intensify this effect.  Use care when operating dangerous machinery.  This prescription cannot be refilled.  This product contains acetaminophen.  Do not use  with any other product containing acetaminophen to prevent possible liver damage.  Using more of this medication than prescribed may cause serious breathing problems.    -- Indication: For Pain    apixaban 5 mg oral tablet  -- 2 tabs by mouth every 12 hours until 7/15/18. Then start 1 tab every 12 hours for 3 months. 204 tabs.  -- Check with your doctor before becoming pregnant.  It is very important that you take or use this exactly as directed.  Do not skip doses or discontinue unless directed by your doctor.  Obtain medical advice before taking any non-prescription drugs as some may affect the action of this medication.    -- Indication: For Pulmonary embolism treatment    atorvastatin 10 mg oral tablet  -- 1 tab(s) by mouth once a day  -- Indication: For Hyperlipidemia    bisacodyl 10 mg rectal suppository  -- 1 suppository(ies) rectally once a day, As needed, If no bowel movement by postoperative day #2  -- Indication: For Constipation    docusate sodium 100 mg oral capsule  -- 1 cap(s) by mouth 3 times a day  -- Indication: For Constipation    polyethylene glycol 3350 oral powder for reconstitution  -- 17 gram(s) by mouth once a day  -- Indication: For Constipation    senna oral tablet  -- 2 tab(s) by mouth once a day (at bedtime), As needed, Constipation  -- Indication: For Constipation    Dexilant 60 mg oral delayed release capsule  -- 1 cap(s) by mouth once a day  -- Indication: For gastric reflux

## 2018-06-28 NOTE — DISCHARGE NOTE ADULT - HOSPITAL COURSE
Admit 6/27/18  OR 6/27/18- R TKA  Periop Antibx  DVT ppx  PT   Pain mgt Admit 6/27/18  OR 6/27/18- R TKA  Periop Antibx  DVT ppx  PT   Pain mgt   Pulm cs - treatment of bilateral lung PE

## 2018-06-28 NOTE — PHYSICAL THERAPY INITIAL EVALUATION ADULT - PLANNED THERAPY INTERVENTIONS, PT EVAL
ROM/strengthening/bed mobility training/gait training/postural re-education/transfer training/balance training

## 2018-06-28 NOTE — PHYSICAL THERAPY INITIAL EVALUATION ADULT - GENERAL OBSERVATIONS, REHAB EVAL
Patient encountered supine in bed, NAD, +CB, RN Yoanna cleared patient to ambulate, +SCD's b/l, surgical site C/D/I with ace wrap and cryocuff, +heplock.

## 2018-06-29 DIAGNOSIS — I26.09 OTHER PULMONARY EMBOLISM WITH ACUTE COR PULMONALE: ICD-10-CM

## 2018-06-29 DIAGNOSIS — I50.810 RIGHT HEART FAILURE, UNSPECIFIED: ICD-10-CM

## 2018-06-29 DIAGNOSIS — I27.20 PULMONARY HYPERTENSION, UNSPECIFIED: ICD-10-CM

## 2018-06-29 LAB
APTT BLD: 28.3 SEC — SIGNIFICANT CHANGE UP (ref 27.5–37.4)
INR BLD: 1.23 — HIGH (ref 0.88–1.16)
NT-PROBNP SERPL-SCNC: 3896 PG/ML — HIGH (ref 0–300)
PROTHROM AB SERPL-ACNC: 13.7 SEC — HIGH (ref 9.8–12.7)
TROPONIN T SERPL-MCNC: 0.07 NG/ML — CRITICAL HIGH (ref 0–0.01)

## 2018-06-29 PROCEDURE — 99223 1ST HOSP IP/OBS HIGH 75: CPT | Mod: GC

## 2018-06-29 PROCEDURE — 71275 CT ANGIOGRAPHY CHEST: CPT | Mod: 26

## 2018-06-29 PROCEDURE — 71045 X-RAY EXAM CHEST 1 VIEW: CPT | Mod: 26

## 2018-06-29 RX ORDER — HEPARIN SODIUM 5000 [USP'U]/ML
8000 INJECTION INTRAVENOUS; SUBCUTANEOUS ONCE
Qty: 0 | Refills: 0 | Status: COMPLETED | OUTPATIENT
Start: 2018-06-29 | End: 2018-06-29

## 2018-06-29 RX ORDER — HEPARIN SODIUM 5000 [USP'U]/ML
8000 INJECTION INTRAVENOUS; SUBCUTANEOUS EVERY 6 HOURS
Qty: 0 | Refills: 0 | Status: DISCONTINUED | OUTPATIENT
Start: 2018-06-29 | End: 2018-07-01

## 2018-06-29 RX ORDER — IPRATROPIUM/ALBUTEROL SULFATE 18-103MCG
3 AEROSOL WITH ADAPTER (GRAM) INHALATION ONCE
Qty: 0 | Refills: 0 | Status: COMPLETED | OUTPATIENT
Start: 2018-06-29 | End: 2018-06-29

## 2018-06-29 RX ORDER — SODIUM CHLORIDE 9 MG/ML
500 INJECTION INTRAMUSCULAR; INTRAVENOUS; SUBCUTANEOUS ONCE
Qty: 0 | Refills: 0 | Status: COMPLETED | OUTPATIENT
Start: 2018-06-29 | End: 2018-06-29

## 2018-06-29 RX ORDER — HEPARIN SODIUM 5000 [USP'U]/ML
4000 INJECTION INTRAVENOUS; SUBCUTANEOUS EVERY 6 HOURS
Qty: 0 | Refills: 0 | Status: DISCONTINUED | OUTPATIENT
Start: 2018-06-29 | End: 2018-07-01

## 2018-06-29 RX ORDER — HEPARIN SODIUM 5000 [USP'U]/ML
INJECTION INTRAVENOUS; SUBCUTANEOUS
Qty: 25000 | Refills: 0 | Status: DISCONTINUED | OUTPATIENT
Start: 2018-06-29 | End: 2018-06-30

## 2018-06-29 RX ORDER — IPRATROPIUM/ALBUTEROL SULFATE 18-103MCG
3 AEROSOL WITH ADAPTER (GRAM) INHALATION EVERY 6 HOURS
Qty: 0 | Refills: 0 | Status: DISCONTINUED | OUTPATIENT
Start: 2018-06-29 | End: 2018-07-03

## 2018-06-29 RX ADMIN — PANTOPRAZOLE SODIUM 40 MILLIGRAM(S): 20 TABLET, DELAYED RELEASE ORAL at 12:11

## 2018-06-29 RX ADMIN — Medication 975 MILLIGRAM(S): at 05:49

## 2018-06-29 RX ADMIN — CELECOXIB 200 MILLIGRAM(S): 200 CAPSULE ORAL at 04:56

## 2018-06-29 RX ADMIN — Medication 975 MILLIGRAM(S): at 04:57

## 2018-06-29 RX ADMIN — Medication 100 MILLIGRAM(S): at 04:56

## 2018-06-29 RX ADMIN — Medication 975 MILLIGRAM(S): at 13:55

## 2018-06-29 RX ADMIN — Medication 100 MILLIGRAM(S): at 13:56

## 2018-06-29 RX ADMIN — POLYETHYLENE GLYCOL 3350 17 GRAM(S): 17 POWDER, FOR SOLUTION ORAL at 12:11

## 2018-06-29 RX ADMIN — Medication 325 MILLIGRAM(S): at 18:41

## 2018-06-29 RX ADMIN — HEPARIN SODIUM 8000 UNIT(S): 5000 INJECTION INTRAVENOUS; SUBCUTANEOUS at 20:56

## 2018-06-29 RX ADMIN — Medication 3 MILLILITER(S): at 10:48

## 2018-06-29 RX ADMIN — OXYCODONE HYDROCHLORIDE 10 MILLIGRAM(S): 5 TABLET ORAL at 21:06

## 2018-06-29 RX ADMIN — OXYCODONE HYDROCHLORIDE 10 MILLIGRAM(S): 5 TABLET ORAL at 22:30

## 2018-06-29 RX ADMIN — Medication 100 MILLIGRAM(S): at 21:05

## 2018-06-29 RX ADMIN — CELECOXIB 200 MILLIGRAM(S): 200 CAPSULE ORAL at 18:41

## 2018-06-29 RX ADMIN — OXYCODONE HYDROCHLORIDE 5 MILLIGRAM(S): 5 TABLET ORAL at 13:53

## 2018-06-29 RX ADMIN — SODIUM CHLORIDE 1000 MILLILITER(S): 9 INJECTION INTRAMUSCULAR; INTRAVENOUS; SUBCUTANEOUS at 10:47

## 2018-06-29 RX ADMIN — Medication 975 MILLIGRAM(S): at 22:30

## 2018-06-29 RX ADMIN — OXYCODONE HYDROCHLORIDE 10 MILLIGRAM(S): 5 TABLET ORAL at 05:48

## 2018-06-29 RX ADMIN — Medication 325 MILLIGRAM(S): at 04:56

## 2018-06-29 RX ADMIN — Medication 975 MILLIGRAM(S): at 21:06

## 2018-06-29 RX ADMIN — OXYCODONE HYDROCHLORIDE 10 MILLIGRAM(S): 5 TABLET ORAL at 04:56

## 2018-06-29 RX ADMIN — CELECOXIB 200 MILLIGRAM(S): 200 CAPSULE ORAL at 05:49

## 2018-06-29 RX ADMIN — HEPARIN SODIUM 1800 UNIT(S)/HR: 5000 INJECTION INTRAVENOUS; SUBCUTANEOUS at 20:56

## 2018-06-29 RX ADMIN — CELECOXIB 200 MILLIGRAM(S): 200 CAPSULE ORAL at 19:00

## 2018-06-29 NOTE — PROGRESS NOTE ADULT - SUBJECTIVE AND OBJECTIVE BOX
Ortho Progress Note    Subjective:  73y Female s/p R TKA , POD# 1. Patient seen and examined, doing well, pain endorsed but controlled. No acute events overnight. Denies CP/SOB/N/V      Objective:    Vital Signs Last 24 Hrs  T(C): 37.5 (29 Jun 2018 04:59), Max: 37.6 (28 Jun 2018 20:31)  T(F): 99.5 (29 Jun 2018 04:59), Max: 99.6 (28 Jun 2018 20:31)  HR: 72 (29 Jun 2018 04:59) (63 - 72)  BP: 101/64 (29 Jun 2018 04:59) (101/64 - 125/60)  BP(mean): --  RR: 17 (29 Jun 2018 04:59) (16 - 17)  SpO2: 95% (29 Jun 2018 04:59) (95% - 96%)    NAD, AOx3, comfortable    RLE  Dressing: C/D/I  Motor: 5/5 GS/TA/EHL/FHL    Sensation: SILT s/sp/dp/tib  Pulses:  DP/PT 2+ , toes/foot WWP            A/P:  73y Female s/p above procedure , POD#   2  -Stable  -Pain Control  -PT/WBAT  -DVT ppx:  BID / SCDs  -Advance diet as tolerated  -f/u AM labs  -Dispo: home       Johnathan Street MD, PGY-1  [phone]

## 2018-06-29 NOTE — PROGRESS NOTE ADULT - SUBJECTIVE AND OBJECTIVE BOX
ORTHO NOTE    [x ] Pt seen/examined.  [ ] Pt without any complaints/in NAD.    [x ] Pt complains of: shortness of breath and lightheadness when ambulating       ROS: [ ] Fever  [ ] Chills  [ ] CP [ ] SOB [ ] Dysnea  [ ] Palpitations [ ] Cough [ ] N/V/C/D [ ] Paresthia [ ] Other     [x ] ROS  otherwise negative    .    PHYSICAL EXAM:    Vital Signs Last 24 Hrs  T(C): 36.6 (29 Jun 2018 15:28), Max: 37.6 (28 Jun 2018 20:31)  T(F): 97.8 (29 Jun 2018 15:28), Max: 99.6 (28 Jun 2018 20:31)  HR: 78 (29 Jun 2018 15:28) (65 - 80)  BP: 121/78 (29 Jun 2018 15:28) (95/65 - 123/77)  BP(mean): --  RR: 16 (29 Jun 2018 15:28) (16 - 18)  SpO2: 95% (29 Jun 2018 15:28) (89% - 96%)    I&O's Detail    28 Jun 2018 07:01  -  29 Jun 2018 07:00  --------------------------------------------------------  IN:    Oral Fluid: 660 mL  Total IN: 660 mL    OUT:    Voided: 1900 mL  Total OUT: 1900 mL    Total NET: -1240 mL      29 Jun 2018 07:01  -  29 Jun 2018 16:48  --------------------------------------------------------  IN:    Oral Fluid: 600 mL  Total IN: 600 mL    OUT:    Voided: 1300 mL  Total OUT: 1300 mL    Total NET: -700 mL           CAPILLARY BLOOD GLUCOSE                      Neuro: AAOX3    Lungs: nonlabored, Spo2 75% while ambulation during PT and Spo2 92% on RA in bed up to 95% on 2L NC    CV: HR 80s, abnormal ekg consistent with pre-op findings    ABD: soft, nontender    Ext:  right knee aquacel cdi  Dressing: C/D/I  Motor: 5/5 GS/TA/EHL/FHL    Sensation: SILT s/sp/dp/tib  Pulses:  DP/PT 2+ , toes/foot WWP    LABS                        11.2   12.0  )-----------( 202      ( 28 Jun 2018 07:59 )             34.0                                06-28    138  |  102  |  19  ----------------------------<  131<H>  3.9   |  26  |  0.81    Ca    8.5      28 Jun 2018 07:59        [ ] Other Labs  [ ] None ordered            Please check or [place] when present:  •  Heart Failure:    [ ] Acute        [ ]  Acute on Chronic        [ ] Chronic         [ ] Diastolic     [ ]  Combined    •  JONATHAN:     [ ] ATN        [ ]  Renal medullary necrosis       [ ]  Renal cortical necrosis                  [ ] Other pathological Lesion:  •  CKD:  [ ] Stage I   [ ] Stage II  [ ] Stage III    [ ]Stage IV   [ ]  CKD V   [ ]  Other/Unspecified:    •  Abdominal Nutritional Status:   [ ] Malnutrition-See Nutrition note    [ ] Cachexia   [ ]  Other        [ ] Supplement ordered:            [ ] Morbid Obesity: BMI >=40         ASSESSMENT/PLAN:      STATUS POST: pod2 R TKA  hypoxia and near syncopal episode during PT.  PE protocol.  f/u CTA results and Dr. Salazar pulmonology to follow  supplemental oxygen and prn nebulizers  pain control   bowel regimen  transfer to telemetry for monitoring  CONTINUE:          [ ] PT- wbat    [ ] DVT PPX- scd, ASA    [ ] Pain Mgt- po meds per pain mngt    [ ] Dispo plan- home, HPT pending medical and PT clearance

## 2018-06-29 NOTE — PROGRESS NOTE ADULT - SUBJECTIVE AND OBJECTIVE BOX
Patient seen at bedside this afternoon at 4:15pm.   Patient laying in bed, AAOX3.  She looks and states she feels better at rest, now requiring oxygen ([place])  Acute episode earlier today, dyspnea with ambulation while supervised with PT, destated 70s%   Patient is now on bedrest, pending further work-up rule out possible etiology.  Her pain improved today with limited mobility.  Nausea and malaise improving.    Patient mood is positive and states she remains motivated to progress her recovery despite acute medical complication.         VITALS REVIEWED

## 2018-06-29 NOTE — CONSULT NOTE ADULT - SUBJECTIVE AND OBJECTIVE BOX
Patient is a 73y old  Female who presents with a chief complaint of shortness of breath      HPI:  74yo f c/o right knee pain less than 1 y.  Patient was diagnosed with OA and is s/p Rt knee replacement and is POD#1. This afternoon patient reported that she felt short of breath and was noted to be hypoxic with increased oxygen requirement. She also noted some mild chest pain. After being placed on O2 she felt better and reports that her pain is better but she does feel short of breath with moderate exertion. Patient had a CTA which showed b/l segmental and subsegmental PE with RV strain. She denies a FH of blood clots or having clots in the past. Denies fevers, chills, cough, sputum, hemoptysis, abdominal pain.    ROS:  Per HPI, otherwise 12 point ROS negative      PAST MEDICAL & SURGICAL HISTORY:  Migraines  OA (osteoarthritis)  Hyperlipidemia  Contracture of right ankle      FAMILY HISTORY:      SOCIAL HISTORY:  Smoking Status: [ ] Current, [ ] Former, [x] Never  Pack Years:    MEDICATIONS:  Pulmonary:  ALBUTerol/ipratropium for Nebulization 3 milliLiter(s) Nebulizer every 6 hours PRN    Antimicrobials:    Anticoagulants:  aspirin enteric coated 325 milliGRAM(s) Oral two times a day  heparin  Infusion.  Unit(s)/Hr IV Continuous <Continuous>  heparin  Injectable 8000 Unit(s) IV Push once  heparin  Injectable 8000 Unit(s) IV Push every 6 hours PRN  heparin  Injectable 4000 Unit(s) IV Push every 6 hours PRN    Onc:    GI/:  aluminum hydroxide/magnesium hydroxide/simethicone Suspension 30 milliLiter(s) Oral four times a day PRN  bisacodyl Suppository 10 milliGRAM(s) Rectal daily PRN  docusate sodium 100 milliGRAM(s) Oral three times a day  magnesium hydroxide Suspension 30 milliLiter(s) Oral daily PRN  pantoprazole    Tablet 40 milliGRAM(s) Oral daily  polyethylene glycol 3350 17 Gram(s) Oral daily  senna 2 Tablet(s) Oral at bedtime PRN    Endocrine:    Cardiac:    Other Medications:  acetaminophen   Tablet 650 milliGRAM(s) Oral every 6 hours PRN  acetaminophen   Tablet. 975 milliGRAM(s) Oral every 8 hours  BUpivacaine liposome 1.3% Injectable (no eMAR) 20 milliLiter(s) Local Injection once  celecoxib 200 milliGRAM(s) Oral two times a day  HYDROmorphone  Injectable 0.5 milliGRAM(s) IV Push every 4 hours PRN  ketorolac   Injectable 15 milliGRAM(s) IV Push every 6 hours PRN  ondansetron Injectable 4 milliGRAM(s) IV Push every 6 hours PRN  oxyCODONE    IR 5 milliGRAM(s) Oral every 4 hours PRN  oxyCODONE    IR 10 milliGRAM(s) Oral every 6 hours PRN  SUMAtriptan 25 milliGRAM(s) Oral once      Allergies    No Known Allergies    Intolerances        Vital Signs Last 24 Hrs  T(C): 37.1 (29 Jun 2018 19:01), Max: 37.6 (28 Jun 2018 20:31)  T(F): 98.7 (29 Jun 2018 19:01), Max: 99.6 (28 Jun 2018 20:31)  HR: 70 (29 Jun 2018 18:08) (65 - 80)  BP: 140/62 (29 Jun 2018 18:08) (95/65 - 140/62)  BP(mean): 88 (29 Jun 2018 18:08) (88 - 88)  RR: 16 (29 Jun 2018 18:08) (16 - 18)  SpO2: 97% (29 Jun 2018 18:08) (89% - 97%)    06-28 @ 07:01 - 06-29 @ 07:00  --------------------------------------------------------  IN: 660 mL / OUT: 1900 mL / NET: -1240 mL    06-29 @ 07:01 - 06-29 @ 20:25  --------------------------------------------------------  IN: 950 mL / OUT: 1300 mL / NET: -350 mL      General: NAD, AAO x3, Obese  HEENT: No icterus,. Moist mucous membranes  Neck: No JVD noted. Supple, no meningismus  Cardio: S1, S2 noted, RRR. No murmurs, rubs or gallops  Resp: Clear to auscultation b/l. No adventitious sounds  Abdo: Soft, NT, bowel sounds present. No organomegaly  Extremities: Rt leg noted in brace. Mild swelling noted.  Neuro: AAO x3, grossly normal motor strength.  Lymphnodes: no lymphadenopathy identified.  Skin: Dry, no rashes    LABS:      CBC Full  -  ( 28 Jun 2018 07:59 )  WBC Count : 12.0 K/uL  Hemoglobin : 11.2 g/dL  Hematocrit : 34.0 %  Platelet Count - Automated : 202 K/uL  Mean Cell Volume : 94.7 fL  Mean Cell Hemoglobin : 31.2 pg  Mean Cell Hemoglobin Concentration : 32.9 g/dL  Auto Neutrophil # : x  Auto Lymphocyte # : x  Auto Monocyte # : x  Auto Eosinophil # : x  Auto Basophil # : x  Auto Neutrophil % : x  Auto Lymphocyte % : x  Auto Monocyte % : x  Auto Eosinophil % : x  Auto Basophil % : x    06-28    138  |  102  |  19  ----------------------------<  131<H>  3.9   |  26  |  0.81    Ca    8.5      28 Jun 2018 07:59                        RADIOLOGY & ADDITIONAL STUDIES (The following images were personally reviewed):

## 2018-06-29 NOTE — CONSULT NOTE ADULT - ATTENDING COMMENTS
Patient seen and examined with house-staff during bedside rounds.  Resident note read, including vitals, physical findings, laboratory data, and radiological reports.   Revisions included below.  Direct personal management at bed side and extensive interpretation of the data.  Plan was outlined and discussed in details with the housestaff.  Decision making of high complexity  Action taken for acute disease activity to reflect the level of care provided:  - medication reconciliation  - review laboratory data  Patient seen examined with the fellow. The CAT scan was reviewed discussed with Dr. Santos. Patient has sub massive pulmonary emboli provoked. Echocardiogram in the morning. Start IV heparin monitor PTT and four hours. Monitored the one from bleeding.

## 2018-06-29 NOTE — CONSULT NOTE ADULT - PROBLEM SELECTOR RECOMMENDATION 2
Significant clot burden but not in main PA branches.  - Suspect there is an element of acute on chronic  - Echo to assess for Rt heart function.  - No evidence of decompensation now

## 2018-06-29 NOTE — PROGRESS NOTE ADULT - EXTREMITIES COMMENTS
Active range of motion ankle and foot on the right side, no calf tenderness bilaterally, diffuse active range of motion to the left lower extremity
Right lower extremity, edema distal to site of surgery site- right knee.  Left lower extremity no edema

## 2018-06-29 NOTE — CONSULT NOTE ADULT - PROBLEM SELECTOR RECOMMENDATION 3
CT chest does not show evidence of mosaicism to suggest CTEPH.  - Could have undiagnosed DELORES contributing to PH and Rt heart strain  - may need VQ scan as outpatient  - Echo now and repeat in 3 months

## 2018-06-29 NOTE — CONSULT NOTE ADULT - PROBLEM SELECTOR RECOMMENDATION 9
Patient has b/l PE with significant clot burden. No parenchymal lung disease identified.  - RV strain noted on CT with enlarged PA diameter.  - Likely provoked in the setting of recent knee surgery  - PESI score: 91 ( age, O2) Class III, Intermediate Risk: 3.2-7.1% 30-day mortality  Recommend:  - Agree with heparin drip with bolus. Discussed with Orthopedic team  - PTT goal 60-80. Repeat PTT in 4hrs after starting drip  - No indication for cathter directed therapy as of now.  - Likely need 6 months of therapy as although this is provoked, its submassive.  - Recommend LE doppler  - Recommend Echo to assess for PA pressures and Rt heart function  - Recommend Troponin and pro BNP.  - O2 as needed  - Incentive spirometer. OOB as tolerated.

## 2018-06-29 NOTE — PROGRESS NOTE ADULT - MUSCULOSKELETAL
details… detailed exam decreased ROM due to pain/decreased ROM/Right Knee- surgical site/joint swelling

## 2018-06-30 LAB
ANION GAP SERPL CALC-SCNC: 15 MMOL/L — SIGNIFICANT CHANGE UP (ref 5–17)
APTT BLD: 143.4 SEC — CRITICAL HIGH (ref 27.5–37.4)
APTT BLD: 52.2 SEC — HIGH (ref 27.5–37.4)
APTT BLD: >200 SEC — CRITICAL HIGH (ref 27.5–37.4)
BUN SERPL-MCNC: 16 MG/DL — SIGNIFICANT CHANGE UP (ref 7–23)
CALCIUM SERPL-MCNC: 8.8 MG/DL — SIGNIFICANT CHANGE UP (ref 8.4–10.5)
CHLORIDE SERPL-SCNC: 98 MMOL/L — SIGNIFICANT CHANGE UP (ref 96–108)
CO2 SERPL-SCNC: 23 MMOL/L — SIGNIFICANT CHANGE UP (ref 22–31)
CREAT SERPL-MCNC: 0.8 MG/DL — SIGNIFICANT CHANGE UP (ref 0.5–1.3)
GLUCOSE SERPL-MCNC: 192 MG/DL — HIGH (ref 70–99)
HCT VFR BLD CALC: 30.6 % — LOW (ref 34.5–45)
HGB BLD-MCNC: 10 G/DL — LOW (ref 11.5–15.5)
INR BLD: 1.2 — HIGH (ref 0.88–1.16)
MCHC RBC-ENTMCNC: 31.5 PG — SIGNIFICANT CHANGE UP (ref 27–34)
MCHC RBC-ENTMCNC: 32.7 G/DL — SIGNIFICANT CHANGE UP (ref 32–36)
MCV RBC AUTO: 96.5 FL — SIGNIFICANT CHANGE UP (ref 80–100)
PLATELET # BLD AUTO: 164 K/UL — SIGNIFICANT CHANGE UP (ref 150–400)
POTASSIUM SERPL-MCNC: 3.9 MMOL/L — SIGNIFICANT CHANGE UP (ref 3.5–5.3)
POTASSIUM SERPL-SCNC: 3.9 MMOL/L — SIGNIFICANT CHANGE UP (ref 3.5–5.3)
PROTHROM AB SERPL-ACNC: 13.4 SEC — HIGH (ref 9.8–12.7)
RBC # BLD: 3.17 M/UL — LOW (ref 3.8–5.2)
RBC # FLD: 12.9 % — SIGNIFICANT CHANGE UP (ref 10.3–16.9)
SODIUM SERPL-SCNC: 136 MMOL/L — SIGNIFICANT CHANGE UP (ref 135–145)
WBC # BLD: 9 K/UL — SIGNIFICANT CHANGE UP (ref 3.8–10.5)
WBC # FLD AUTO: 9 K/UL — SIGNIFICANT CHANGE UP (ref 3.8–10.5)

## 2018-06-30 PROCEDURE — 99232 SBSQ HOSP IP/OBS MODERATE 35: CPT | Mod: GC

## 2018-06-30 PROCEDURE — 93306 TTE W/DOPPLER COMPLETE: CPT | Mod: 26

## 2018-06-30 RX ORDER — HEPARIN SODIUM 5000 [USP'U]/ML
1500 INJECTION INTRAVENOUS; SUBCUTANEOUS
Qty: 25000 | Refills: 0 | Status: DISCONTINUED | OUTPATIENT
Start: 2018-06-30 | End: 2018-06-30

## 2018-06-30 RX ORDER — HEPARIN SODIUM 5000 [USP'U]/ML
1400 INJECTION INTRAVENOUS; SUBCUTANEOUS
Qty: 25000 | Refills: 0 | Status: DISCONTINUED | OUTPATIENT
Start: 2018-06-30 | End: 2018-06-30

## 2018-06-30 RX ORDER — DIAZEPAM 5 MG
5 TABLET ORAL THREE TIMES A DAY
Qty: 0 | Refills: 0 | Status: DISCONTINUED | OUTPATIENT
Start: 2018-06-30 | End: 2018-07-03

## 2018-06-30 RX ORDER — HEPARIN SODIUM 5000 [USP'U]/ML
14 INJECTION INTRAVENOUS; SUBCUTANEOUS
Qty: 25000 | Refills: 0 | Status: DISCONTINUED | OUTPATIENT
Start: 2018-06-30 | End: 2018-06-30

## 2018-06-30 RX ORDER — HEPARIN SODIUM 5000 [USP'U]/ML
1400 INJECTION INTRAVENOUS; SUBCUTANEOUS
Qty: 25000 | Refills: 0 | Status: DISCONTINUED | OUTPATIENT
Start: 2018-06-30 | End: 2018-07-01

## 2018-06-30 RX ADMIN — Medication 975 MILLIGRAM(S): at 06:35

## 2018-06-30 RX ADMIN — Medication 975 MILLIGRAM(S): at 21:09

## 2018-06-30 RX ADMIN — CELECOXIB 200 MILLIGRAM(S): 200 CAPSULE ORAL at 06:35

## 2018-06-30 RX ADMIN — OXYCODONE HYDROCHLORIDE 5 MILLIGRAM(S): 5 TABLET ORAL at 01:10

## 2018-06-30 RX ADMIN — Medication 975 MILLIGRAM(S): at 15:05

## 2018-06-30 RX ADMIN — PANTOPRAZOLE SODIUM 40 MILLIGRAM(S): 20 TABLET, DELAYED RELEASE ORAL at 08:15

## 2018-06-30 RX ADMIN — OXYCODONE HYDROCHLORIDE 5 MILLIGRAM(S): 5 TABLET ORAL at 04:39

## 2018-06-30 RX ADMIN — CELECOXIB 200 MILLIGRAM(S): 200 CAPSULE ORAL at 18:30

## 2018-06-30 RX ADMIN — CELECOXIB 200 MILLIGRAM(S): 200 CAPSULE ORAL at 07:14

## 2018-06-30 RX ADMIN — HYDROMORPHONE HYDROCHLORIDE 0.5 MILLIGRAM(S): 2 INJECTION INTRAMUSCULAR; INTRAVENOUS; SUBCUTANEOUS at 07:50

## 2018-06-30 RX ADMIN — POLYETHYLENE GLYCOL 3350 17 GRAM(S): 17 POWDER, FOR SOLUTION ORAL at 08:15

## 2018-06-30 RX ADMIN — Medication 100 MILLIGRAM(S): at 14:08

## 2018-06-30 RX ADMIN — Medication 975 MILLIGRAM(S): at 07:50

## 2018-06-30 RX ADMIN — OXYCODONE HYDROCHLORIDE 5 MILLIGRAM(S): 5 TABLET ORAL at 14:16

## 2018-06-30 RX ADMIN — Medication 975 MILLIGRAM(S): at 22:24

## 2018-06-30 RX ADMIN — OXYCODONE HYDROCHLORIDE 5 MILLIGRAM(S): 5 TABLET ORAL at 02:30

## 2018-06-30 RX ADMIN — Medication 100 MILLIGRAM(S): at 06:35

## 2018-06-30 RX ADMIN — OXYCODONE HYDROCHLORIDE 5 MILLIGRAM(S): 5 TABLET ORAL at 21:00

## 2018-06-30 RX ADMIN — Medication 100 MILLIGRAM(S): at 21:09

## 2018-06-30 RX ADMIN — OXYCODONE HYDROCHLORIDE 5 MILLIGRAM(S): 5 TABLET ORAL at 20:36

## 2018-06-30 RX ADMIN — OXYCODONE HYDROCHLORIDE 5 MILLIGRAM(S): 5 TABLET ORAL at 15:05

## 2018-06-30 RX ADMIN — HYDROMORPHONE HYDROCHLORIDE 0.5 MILLIGRAM(S): 2 INJECTION INTRAMUSCULAR; INTRAVENOUS; SUBCUTANEOUS at 07:16

## 2018-06-30 RX ADMIN — Medication 975 MILLIGRAM(S): at 14:08

## 2018-06-30 RX ADMIN — OXYCODONE HYDROCHLORIDE 5 MILLIGRAM(S): 5 TABLET ORAL at 06:00

## 2018-06-30 RX ADMIN — CELECOXIB 200 MILLIGRAM(S): 200 CAPSULE ORAL at 17:50

## 2018-06-30 RX ADMIN — Medication 5 MILLIGRAM(S): at 22:27

## 2018-06-30 RX ADMIN — HEPARIN SODIUM 14 UNIT(S)/HR: 5000 INJECTION INTRAVENOUS; SUBCUTANEOUS at 14:40

## 2018-06-30 NOTE — PROGRESS NOTE ADULT - SUBJECTIVE AND OBJECTIVE BOX
Interval Events: Reviewed  Patient seen and examined at bedside.    Patient is a 73y old  Female who presents with a chief complaint of right knee pain/ right total knee replacement 6/27/18 (28 Jun 2018 12:56)    she is doing better  PAST MEDICAL & SURGICAL HISTORY:  Migraines  OA (osteoarthritis)  Hyperlipidemia  Contracture of right ankle      MEDICATIONS:  Pulmonary:  ALBUTerol/ipratropium for Nebulization 3 milliLiter(s) Nebulizer every 6 hours PRN    Antimicrobials:    Anticoagulants:  heparin  Infusion 1400 Unit(s)/Hr IV Continuous <Continuous>  heparin  Injectable 8000 Unit(s) IV Push every 6 hours PRN  heparin  Injectable 4000 Unit(s) IV Push every 6 hours PRN    Cardiac:      Allergies    No Known Allergies    Intolerances        Vital Signs Last 24 Hrs  T(C): 37.6 (30 Jun 2018 14:00), Max: 37.6 (30 Jun 2018 14:00)  T(F): 99.7 (30 Jun 2018 14:00), Max: 99.7 (30 Jun 2018 14:00)  HR: 64 (30 Jun 2018 14:34) (62 - 72)  BP: 137/56 (30 Jun 2018 14:34) (96/61 - 140/62)  BP(mean): 81 (30 Jun 2018 14:34) (77 - 88)  RR: 19 (30 Jun 2018 14:34) (16 - 21)  SpO2: 99% (30 Jun 2018 14:34) (96% - 99%)    06-29 @ 07:01 - 06-30 @ 07:00  --------------------------------------------------------  IN: 1232 mL / OUT: 1900 mL / NET: -668 mL    06-30 @ 07:01 - 06-30 @ 16:18  --------------------------------------------------------  IN: 103 mL / OUT: 1325 mL / NET: -1222 mL          LABS:      CBC Full  -  ( 30 Jun 2018 09:33 )  WBC Count : 9.0 K/uL  Hemoglobin : 10.0 g/dL  Hematocrit : 30.6 %  Platelet Count - Automated : 164 K/uL  Mean Cell Volume : 96.5 fL  Mean Cell Hemoglobin : 31.5 pg  Mean Cell Hemoglobin Concentration : 32.7 g/dL  Auto Neutrophil # : x  Auto Lymphocyte # : x  Auto Monocyte # : x  Auto Eosinophil # : x  Auto Basophil # : x  Auto Neutrophil % : x  Auto Lymphocyte % : x  Auto Monocyte % : x  Auto Eosinophil % : x  Auto Basophil % : x    06-30    136  |  98  |  16  ----------------------------<  192<H>  3.9   |  23  |  0.80    Ca    8.8      30 Jun 2018 09:33      PT/INR - ( 29 Jun 2018 20:04 )   PT: 13.7 sec;   INR: 1.23          PTT - ( 30 Jun 2018 12:36 )  PTT:>200.0 sec          < from: Echocardiogram (06.30.18 @ 12:00) >  EXAM:  ECHOCARDIOGRAM (CARDIOL)                          PROCEDURE DATE:  06/30/2018          INTERPRETATION:  Patient Height: 173.0 cm  Patient Weight: 98.0 kg  Systolic Pressure: 117 mmHg  Diastolic Pressure: 70 mmHg  BSA: 2.1 m^2  InterpretationSummary  Left ventricular hypertrophy presentProbably normal left ventricular wall   motion.The left ventricular ejection fraction is estimated to be   50-55%The   right ventricle is dilated.The right ventricular free wall is   hypokintic.The   left atrial size is normal.The aortic valve is trileaflet.No   hemodynamically   significant valvular aortic stenosis.There is trace mitral   regurgitation.There   is mild to moderate tricuspid regurgitation.The pulmonary artery systolic   pressure is estimated to be 35 mmHg.There is mild pulmonic   regurgitation.The   ascending aorta is dilated.The ascending aorta measures 3.9 cm (normal   less   than 3.8 cm for men, less than 3.5 cm for women).  The aortic root is   dilated.The aortic root measures 3.8 cm at sinuses (normal less than 4   cm for   men, less than 3.6 cm for women).  The inferior vena cava is normal in   size   (<2.1 cm) with normal inspiratory collapse (>50%) consistent with normal   right   atrial pressure.  A trivial pericardial effusion noted.    < end of copied text >            RADIOLOGY & ADDITIONAL STUDIES (The following images were personally reviewed):  Lloyd:                                     No  Urine output:                       adequate  DVT prophylaxis:                 Yes  Flattus:                                  Yes  Bowel movement:              No

## 2018-06-30 NOTE — PROGRESS NOTE ADULT - SUBJECTIVE AND OBJECTIVE BOX
Ortho Progress Note    Subjective:  73y Female s/p R TKA ,  Patient seen and examined, doing well, pain endorsed but controlled. Had SOB/CP and Desatted w/ PT yesterday. CTA PE Protcol performed and + for b/l PE. Started Hep gtt per Dr. Salazar. Pt stable this am, feeling better Denies CP/SOB/N/V      Objective:    Vital Signs Last 24 Hrs  T(C): 36.9 (30 Jun 2018 05:04), Max: 37.1 (29 Jun 2018 19:01)  T(F): 98.5 (30 Jun 2018 05:04), Max: 98.7 (29 Jun 2018 19:01)  HR: 70 (30 Jun 2018 05:25) (62 - 80)  BP: 116/60 (30 Jun 2018 05:25) (95/65 - 140/62)  BP(mean): 77 (30 Jun 2018 05:25) (77 - 88)  RR: 18 (30 Jun 2018 05:25) (16 - 21)  SpO2: 97% (30 Jun 2018 05:25) (89% - 98%)    NAD, AOx3, comfortable    RLE  Dressing: C/D/I  Motor: 5/5 GS/TA/EHL/FHL    Sensation: SILT s/sp/dp/tib  Pulses:  DP/PT 2+ , toes/foot WWP                          11.2   12.0  )-----------( 202      ( 28 Jun 2018 07:59 )             34.0           A/P:  73y Female s/p above procedure , POD# 3  -Stable  -Pain Control  -PT/WBAT  -DVT ppx: Heparin gtt  -Advance diet as tolerated  -f/u AM labs  -f/u Echo  -f/u recs per Dr. Salazar  -Dispo: home       Johnathan Street MD, PGY-1  [phone]

## 2018-07-01 LAB
ANION GAP SERPL CALC-SCNC: 10 MMOL/L — SIGNIFICANT CHANGE UP (ref 5–17)
APTT BLD: 61.5 SEC — HIGH (ref 27.5–37.4)
BUN SERPL-MCNC: 15 MG/DL — SIGNIFICANT CHANGE UP (ref 7–23)
CALCIUM SERPL-MCNC: 9.4 MG/DL — SIGNIFICANT CHANGE UP (ref 8.4–10.5)
CHLORIDE SERPL-SCNC: 104 MMOL/L — SIGNIFICANT CHANGE UP (ref 96–108)
CO2 SERPL-SCNC: 27 MMOL/L — SIGNIFICANT CHANGE UP (ref 22–31)
CREAT SERPL-MCNC: 0.79 MG/DL — SIGNIFICANT CHANGE UP (ref 0.5–1.3)
GLUCOSE SERPL-MCNC: 101 MG/DL — HIGH (ref 70–99)
HCT VFR BLD CALC: 32.3 % — LOW (ref 34.5–45)
HGB BLD-MCNC: 10.5 G/DL — LOW (ref 11.5–15.5)
INR BLD: 1.19 — HIGH (ref 0.88–1.16)
MCHC RBC-ENTMCNC: 31.4 PG — SIGNIFICANT CHANGE UP (ref 27–34)
MCHC RBC-ENTMCNC: 32.5 G/DL — SIGNIFICANT CHANGE UP (ref 32–36)
MCV RBC AUTO: 96.7 FL — SIGNIFICANT CHANGE UP (ref 80–100)
PLATELET # BLD AUTO: 206 K/UL — SIGNIFICANT CHANGE UP (ref 150–400)
POTASSIUM SERPL-MCNC: 4.2 MMOL/L — SIGNIFICANT CHANGE UP (ref 3.5–5.3)
POTASSIUM SERPL-SCNC: 4.2 MMOL/L — SIGNIFICANT CHANGE UP (ref 3.5–5.3)
PROTHROM AB SERPL-ACNC: 13.3 SEC — HIGH (ref 9.8–12.7)
RBC # BLD: 3.34 M/UL — LOW (ref 3.8–5.2)
RBC # FLD: 13.1 % — SIGNIFICANT CHANGE UP (ref 10.3–16.9)
SODIUM SERPL-SCNC: 141 MMOL/L — SIGNIFICANT CHANGE UP (ref 135–145)
WBC # BLD: 7.1 K/UL — SIGNIFICANT CHANGE UP (ref 3.8–10.5)
WBC # FLD AUTO: 7.1 K/UL — SIGNIFICANT CHANGE UP (ref 3.8–10.5)

## 2018-07-01 PROCEDURE — 99232 SBSQ HOSP IP/OBS MODERATE 35: CPT | Mod: GC

## 2018-07-01 RX ORDER — APIXABAN 2.5 MG/1
10 TABLET, FILM COATED ORAL EVERY 12 HOURS
Qty: 0 | Refills: 0 | Status: DISCONTINUED | OUTPATIENT
Start: 2018-07-01 | End: 2018-07-03

## 2018-07-01 RX ORDER — APIXABAN 2.5 MG/1
5 TABLET, FILM COATED ORAL EVERY 12 HOURS
Qty: 0 | Refills: 0 | Status: DISCONTINUED | OUTPATIENT
Start: 2018-07-01 | End: 2018-07-01

## 2018-07-01 RX ORDER — APIXABAN 2.5 MG/1
5 TABLET, FILM COATED ORAL EVERY 12 HOURS
Qty: 0 | Refills: 0 | Status: CANCELLED | OUTPATIENT
Start: 2018-07-08 | End: 2018-07-03

## 2018-07-01 RX ADMIN — Medication 100 MILLIGRAM(S): at 21:13

## 2018-07-01 RX ADMIN — OXYCODONE HYDROCHLORIDE 5 MILLIGRAM(S): 5 TABLET ORAL at 16:00

## 2018-07-01 RX ADMIN — APIXABAN 10 MILLIGRAM(S): 2.5 TABLET, FILM COATED ORAL at 17:28

## 2018-07-01 RX ADMIN — CELECOXIB 200 MILLIGRAM(S): 200 CAPSULE ORAL at 17:33

## 2018-07-01 RX ADMIN — OXYCODONE HYDROCHLORIDE 5 MILLIGRAM(S): 5 TABLET ORAL at 14:38

## 2018-07-01 RX ADMIN — Medication 975 MILLIGRAM(S): at 06:22

## 2018-07-01 RX ADMIN — Medication 975 MILLIGRAM(S): at 21:13

## 2018-07-01 RX ADMIN — CELECOXIB 200 MILLIGRAM(S): 200 CAPSULE ORAL at 06:27

## 2018-07-01 RX ADMIN — OXYCODONE HYDROCHLORIDE 5 MILLIGRAM(S): 5 TABLET ORAL at 10:03

## 2018-07-01 RX ADMIN — Medication 975 MILLIGRAM(S): at 06:27

## 2018-07-01 RX ADMIN — OXYCODONE HYDROCHLORIDE 5 MILLIGRAM(S): 5 TABLET ORAL at 08:19

## 2018-07-01 RX ADMIN — OXYCODONE HYDROCHLORIDE 5 MILLIGRAM(S): 5 TABLET ORAL at 15:08

## 2018-07-01 RX ADMIN — CELECOXIB 200 MILLIGRAM(S): 200 CAPSULE ORAL at 06:22

## 2018-07-01 RX ADMIN — OXYCODONE HYDROCHLORIDE 5 MILLIGRAM(S): 5 TABLET ORAL at 11:54

## 2018-07-01 RX ADMIN — Medication 975 MILLIGRAM(S): at 15:45

## 2018-07-01 RX ADMIN — Medication 100 MILLIGRAM(S): at 14:49

## 2018-07-01 RX ADMIN — Medication 975 MILLIGRAM(S): at 14:49

## 2018-07-01 RX ADMIN — PANTOPRAZOLE SODIUM 40 MILLIGRAM(S): 20 TABLET, DELAYED RELEASE ORAL at 11:54

## 2018-07-01 RX ADMIN — CELECOXIB 200 MILLIGRAM(S): 200 CAPSULE ORAL at 17:27

## 2018-07-01 RX ADMIN — POLYETHYLENE GLYCOL 3350 17 GRAM(S): 17 POWDER, FOR SOLUTION ORAL at 11:54

## 2018-07-01 RX ADMIN — Medication 100 MILLIGRAM(S): at 06:23

## 2018-07-01 NOTE — PROGRESS NOTE ADULT - SUBJECTIVE AND OBJECTIVE BOX
Interval Events: Reviewed  Patient seen and examined at bedside.    Patient is a 73y old  Female who presents with a chief complaint of right knee pain/ right total knee replacement 6/27/18 (28 Jun 2018 12:56)    she is feeling better and wants to do PT  PAST MEDICAL & SURGICAL HISTORY:  Migraines  OA (osteoarthritis)  Hyperlipidemia  Contracture of right ankle      MEDICATIONS:  Pulmonary:  ALBUTerol/ipratropium for Nebulization 3 milliLiter(s) Nebulizer every 6 hours PRN    Antimicrobials:    Anticoagulants:  apixaban 10 milliGRAM(s) Oral every 12 hours  apixaban 5 milliGRAM(s) Oral every 12 hours    Cardiac:      Allergies    No Known Allergies    Intolerances        Vital Signs Last 24 Hrs  T(C): 37.1 (01 Jul 2018 10:00), Max: 37.6 (30 Jun 2018 14:00)  T(F): 98.7 (01 Jul 2018 10:00), Max: 99.7 (30 Jun 2018 14:00)  HR: 72 (01 Jul 2018 11:58) (54 - 72)  BP: 99/44 (01 Jul 2018 11:58) (99/44 - 148/66)  BP(mean): 62 (01 Jul 2018 11:58) (62 - 98)  RR: 22 (01 Jul 2018 11:58) (15 - 22)  SpO2: 99% (01 Jul 2018 11:58) (95% - 100%)    06-30 @ 07:01 - 07-01 @ 07:00  --------------------------------------------------------  IN: 383 mL / OUT: 1825 mL / NET: -1442 mL    07-01 @ 07:01 - 07-01 @ 13:52  --------------------------------------------------------  IN: 180 mL / OUT: 350 mL / NET: -170 mL          LABS:      CBC Full  -  ( 01 Jul 2018 06:23 )  WBC Count : 7.1 K/uL  Hemoglobin : 10.5 g/dL  Hematocrit : 32.3 %  Platelet Count - Automated : 206 K/uL  Mean Cell Volume : 96.7 fL  Mean Cell Hemoglobin : 31.4 pg  Mean Cell Hemoglobin Concentration : 32.5 g/dL  Auto Neutrophil # : x  Auto Lymphocyte # : x  Auto Monocyte # : x  Auto Eosinophil # : x  Auto Basophil # : x  Auto Neutrophil % : x  Auto Lymphocyte % : x  Auto Monocyte % : x  Auto Eosinophil % : x  Auto Basophil % : x    07-01    141  |  104  |  15  ----------------------------<  101<H>  4.2   |  27  |  0.79    Ca    9.4      01 Jul 2018 06:23      PT/INR - ( 01 Jul 2018 06:23 )   PT: 13.3 sec;   INR: 1.19          PTT - ( 01 Jul 2018 06:23 )  PTT:61.5 sec                    RADIOLOGY & ADDITIONAL STUDIES (The following images were personally reviewed):  Lloyd:                                     No  Urine output:                       adequate  DVT prophylaxis:                 Yes  Flattus:                                  Yes  Bowel movement:              No

## 2018-07-01 NOTE — PROGRESS NOTE ADULT - SUBJECTIVE AND OBJECTIVE BOX
Ortho Progress Note    Subjective:  73y Female s/p R TKA ,  Patient seen and examined, doing well, pain endorsed but controlled. Had SOB/CP and Desatted w/ PT yesterday. CTA PE Protcol performed and + for b/l PE. Started Hep gtt per Dr. Salazar, will switch to apixaban today. Pt stable this am, feeling better Denies CP/SOB/N/V      Objective:    Vital Signs Last 24 Hrs  T(C): 36.6 (01 Jul 2018 05:07), Max: 37.6 (30 Jun 2018 14:00)  T(F): 97.8 (01 Jul 2018 05:07), Max: 99.7 (30 Jun 2018 14:00)  HR: 54 (01 Jul 2018 05:08) (54 - 68)  BP: 140/59 (01 Jul 2018 05:08) (96/61 - 140/59)  BP(mean): 87 (01 Jul 2018 05:08) (75 - 98)  RR: 15 (01 Jul 2018 05:08) (15 - 22)  SpO2: 100% (01 Jul 2018 05:08) (95% - 100%)    NAD, AOx3, comfortable    RLE  Dressing: C/D/I  Motor: 5/5 GS/TA/EHL/FHL    Sensation: SILT s/sp/dp/tib  Pulses:  DP/PT 2+ , toes/foot WWP                       A/P:  73y Female s/p above procedure , POD# 4  -Stable  -Pain Control  -PT/WBAT  -DVT ppx: Heparin gtt -> apixaban today  -Advance diet as tolerated  -f/u AM labs  -f/u Echo  -f/u recs per Dr. Salazar  -Dispo: home       Johnathan Street MD, PGY-2  [phone]

## 2018-07-02 LAB
ANION GAP SERPL CALC-SCNC: 10 MMOL/L — SIGNIFICANT CHANGE UP (ref 5–17)
ANION GAP SERPL CALC-SCNC: 14 MMOL/L — SIGNIFICANT CHANGE UP (ref 5–17)
APTT BLD: 33.9 SEC — SIGNIFICANT CHANGE UP (ref 27.5–37.4)
BUN SERPL-MCNC: 15 MG/DL — SIGNIFICANT CHANGE UP (ref 7–23)
BUN SERPL-MCNC: 16 MG/DL — SIGNIFICANT CHANGE UP (ref 7–23)
CALCIUM SERPL-MCNC: 9.4 MG/DL — SIGNIFICANT CHANGE UP (ref 8.4–10.5)
CALCIUM SERPL-MCNC: 9.8 MG/DL — SIGNIFICANT CHANGE UP (ref 8.4–10.5)
CHLORIDE SERPL-SCNC: 101 MMOL/L — SIGNIFICANT CHANGE UP (ref 96–108)
CHLORIDE SERPL-SCNC: 102 MMOL/L — SIGNIFICANT CHANGE UP (ref 96–108)
CO2 SERPL-SCNC: 26 MMOL/L — SIGNIFICANT CHANGE UP (ref 22–31)
CO2 SERPL-SCNC: 27 MMOL/L — SIGNIFICANT CHANGE UP (ref 22–31)
CREAT SERPL-MCNC: 0.83 MG/DL — SIGNIFICANT CHANGE UP (ref 0.5–1.3)
CREAT SERPL-MCNC: 0.85 MG/DL — SIGNIFICANT CHANGE UP (ref 0.5–1.3)
GLUCOSE SERPL-MCNC: 108 MG/DL — HIGH (ref 70–99)
GLUCOSE SERPL-MCNC: 99 MG/DL — SIGNIFICANT CHANGE UP (ref 70–99)
HCT VFR BLD CALC: 31.1 % — LOW (ref 34.5–45)
HCT VFR BLD CALC: 32.8 % — LOW (ref 34.5–45)
HGB BLD-MCNC: 10.2 G/DL — LOW (ref 11.5–15.5)
HGB BLD-MCNC: 10.7 G/DL — LOW (ref 11.5–15.5)
INR BLD: 1.73 — HIGH (ref 0.88–1.16)
MCHC RBC-ENTMCNC: 31.4 PG — SIGNIFICANT CHANGE UP (ref 27–34)
MCHC RBC-ENTMCNC: 31.7 PG — SIGNIFICANT CHANGE UP (ref 27–34)
MCHC RBC-ENTMCNC: 32.6 G/DL — SIGNIFICANT CHANGE UP (ref 32–36)
MCHC RBC-ENTMCNC: 32.8 G/DL — SIGNIFICANT CHANGE UP (ref 32–36)
MCV RBC AUTO: 96.2 FL — SIGNIFICANT CHANGE UP (ref 80–100)
MCV RBC AUTO: 96.6 FL — SIGNIFICANT CHANGE UP (ref 80–100)
PLATELET # BLD AUTO: 225 K/UL — SIGNIFICANT CHANGE UP (ref 150–400)
PLATELET # BLD AUTO: 234 K/UL — SIGNIFICANT CHANGE UP (ref 150–400)
POTASSIUM SERPL-MCNC: 4.3 MMOL/L — SIGNIFICANT CHANGE UP (ref 3.5–5.3)
POTASSIUM SERPL-MCNC: 4.4 MMOL/L — SIGNIFICANT CHANGE UP (ref 3.5–5.3)
POTASSIUM SERPL-SCNC: 4.3 MMOL/L — SIGNIFICANT CHANGE UP (ref 3.5–5.3)
POTASSIUM SERPL-SCNC: 4.4 MMOL/L — SIGNIFICANT CHANGE UP (ref 3.5–5.3)
PROTHROM AB SERPL-ACNC: 19.4 SEC — HIGH (ref 9.8–12.7)
RBC # BLD: 3.22 M/UL — LOW (ref 3.8–5.2)
RBC # BLD: 3.41 M/UL — LOW (ref 3.8–5.2)
RBC # FLD: 12.7 % — SIGNIFICANT CHANGE UP (ref 10.3–16.9)
RBC # FLD: 12.7 % — SIGNIFICANT CHANGE UP (ref 10.3–16.9)
SODIUM SERPL-SCNC: 138 MMOL/L — SIGNIFICANT CHANGE UP (ref 135–145)
SODIUM SERPL-SCNC: 142 MMOL/L — SIGNIFICANT CHANGE UP (ref 135–145)
WBC # BLD: 7.4 K/UL — SIGNIFICANT CHANGE UP (ref 3.8–10.5)
WBC # BLD: 7.8 K/UL — SIGNIFICANT CHANGE UP (ref 3.8–10.5)
WBC # FLD AUTO: 7.4 K/UL — SIGNIFICANT CHANGE UP (ref 3.8–10.5)
WBC # FLD AUTO: 7.8 K/UL — SIGNIFICANT CHANGE UP (ref 3.8–10.5)

## 2018-07-02 PROCEDURE — 99232 SBSQ HOSP IP/OBS MODERATE 35: CPT | Mod: GC

## 2018-07-02 RX ADMIN — OXYCODONE HYDROCHLORIDE 5 MILLIGRAM(S): 5 TABLET ORAL at 05:39

## 2018-07-02 RX ADMIN — Medication 975 MILLIGRAM(S): at 21:38

## 2018-07-02 RX ADMIN — Medication 5 MILLIGRAM(S): at 21:07

## 2018-07-02 RX ADMIN — Medication 975 MILLIGRAM(S): at 15:53

## 2018-07-02 RX ADMIN — OXYCODONE HYDROCHLORIDE 5 MILLIGRAM(S): 5 TABLET ORAL at 15:57

## 2018-07-02 RX ADMIN — OXYCODONE HYDROCHLORIDE 5 MILLIGRAM(S): 5 TABLET ORAL at 17:00

## 2018-07-02 RX ADMIN — Medication 975 MILLIGRAM(S): at 21:08

## 2018-07-02 RX ADMIN — OXYCODONE HYDROCHLORIDE 5 MILLIGRAM(S): 5 TABLET ORAL at 06:44

## 2018-07-02 RX ADMIN — Medication 975 MILLIGRAM(S): at 06:44

## 2018-07-02 RX ADMIN — Medication 100 MILLIGRAM(S): at 05:39

## 2018-07-02 RX ADMIN — APIXABAN 10 MILLIGRAM(S): 2.5 TABLET, FILM COATED ORAL at 18:35

## 2018-07-02 RX ADMIN — CELECOXIB 200 MILLIGRAM(S): 200 CAPSULE ORAL at 06:44

## 2018-07-02 RX ADMIN — CELECOXIB 200 MILLIGRAM(S): 200 CAPSULE ORAL at 05:42

## 2018-07-02 RX ADMIN — Medication 100 MILLIGRAM(S): at 15:53

## 2018-07-02 RX ADMIN — OXYCODONE HYDROCHLORIDE 5 MILLIGRAM(S): 5 TABLET ORAL at 00:12

## 2018-07-02 RX ADMIN — PANTOPRAZOLE SODIUM 40 MILLIGRAM(S): 20 TABLET, DELAYED RELEASE ORAL at 11:53

## 2018-07-02 RX ADMIN — OXYCODONE HYDROCHLORIDE 5 MILLIGRAM(S): 5 TABLET ORAL at 00:30

## 2018-07-02 RX ADMIN — APIXABAN 10 MILLIGRAM(S): 2.5 TABLET, FILM COATED ORAL at 05:39

## 2018-07-02 RX ADMIN — Medication 975 MILLIGRAM(S): at 16:55

## 2018-07-02 RX ADMIN — POLYETHYLENE GLYCOL 3350 17 GRAM(S): 17 POWDER, FOR SOLUTION ORAL at 11:53

## 2018-07-02 RX ADMIN — Medication 975 MILLIGRAM(S): at 05:40

## 2018-07-02 RX ADMIN — Medication 100 MILLIGRAM(S): at 21:07

## 2018-07-02 NOTE — PROGRESS NOTE ADULT - SUBJECTIVE AND OBJECTIVE BOX
ORTHO NOTE    [x ] Pt seen/examined at 11:45am with Dr. Santos.   Denies SOB, difficulty breathing with ambulation  [ ] Pt without any complaints/in NAD.  Denies SOB, difficulty breathing with ambulation    [x ] Pt complains of: right knee pain       ROS: [ ] Fever  [ ] Chills  [ ] CP [ ] SOB [ ] Dysnea  [ ] Palpitations [ ] Cough [ ] N/V/C/D [ ] Paresthia [ ] Other     [x ] ROS  otherwise negative    .    PHYSICAL EXAM:    Vital Signs Last 24 Hrs  T(C): 36.8 (02 Jul 2018 12:35), Max: 36.9 (01 Jul 2018 22:15)  T(F): 98.3 (02 Jul 2018 12:35), Max: 98.5 (02 Jul 2018 09:45)  HR: 64 (02 Jul 2018 11:53) (58 - 76)  BP: 120/71 (02 Jul 2018 11:53) (86/41 - 161/61)  BP(mean): 90 (02 Jul 2018 11:53) (52 - 90)  RR: 16 (02 Jul 2018 11:53) (15 - 45)  SpO2: 100% (02 Jul 2018 11:53) (94% - 100%)    I&O's Detail    01 Jul 2018 07:01  -  02 Jul 2018 07:00  --------------------------------------------------------  IN:    Oral Fluid: 180 mL  Total IN: 180 mL    OUT:    Voided: 1375 mL  Total OUT: 1375 mL    Total NET: -1195 mL           CAPILLARY BLOOD GLUCOSE                      Neuro: AAOX3    Lungs: CTA, nonlabored, Spo2 % on RA    CV: hr 60s    ABD: soft, nontender    Ext: RLE  Dressing: C/D/I  Motor: 5/5 GS/TA/EHL/FHL    Sensation: SILT s/sp/dp/tib  Pulses:  DP/PT 2+ , toes/foot WWP, R LE nvid motor 5/5, ice cryocuff implemented    LABS                        10.2   7.8   )-----------( 225      ( 02 Jul 2018 11:44 )             31.1                              PT/INR - ( 02 Jul 2018 07:27 )   PT: 19.4 sec;   INR: 1.73          PTT - ( 02 Jul 2018 07:27 )  PTT:33.9 sec  07-02    142  |  102  |  16  ----------------------------<  108<H>  4.3   |  26  |  0.85    Ca    9.4      02 Jul 2018 11:24        [ ] Other Labs  [ ] None ordered            Please check or [place] when present:  •  Heart Failure:    [ ] Acute        [ ]  Acute on Chronic        [ ] Chronic         [ ] Diastolic     [ ]  Combined    •  JONATHAN:     [ ] ATN        [ ]  Renal medullary necrosis       [ ]  Renal cortical necrosis                  [ ] Other pathological Lesion:  •  CKD:  [ ] Stage I   [ ] Stage II  [ ] Stage III    [ ]Stage IV   [ ]  CKD V   [ ]  Other/Unspecified:    •  Abdominal Nutritional Status:   [ ] Malnutrition-See Nutrition note    [ ] Cachexia   [ ]  Other        [ ] Supplement ordered:            [ ] Morbid Obesity: BMI >=40         ASSESSMENT/PLAN:      STATUS POST: R TKA pod4  tolerated PT with ambulation and stair.  Denies respiratory symptoms  bilateral PE- appreciate pulmonary recommendations for clearance  pain control  bowel regimen    CONTINUE:          [ ] PT- wbat    [ ] DVT PPX- scd, eliquis 10mg     [ ] Pain Mgt- po meds    [ ] Dispo plan- home ORTHO NOTE    [x ] Pt seen/examined at 11:45am with Dr. Santos.   Denies SOB, difficulty breathing with ambulation  [ ] Pt without any complaints/in NAD.  Denies SOB, difficulty breathing with ambulation    [x ] Pt complains of: right knee pain       ROS: [ ] Fever  [ ] Chills  [ ] CP [ ] SOB [ ] Dysnea  [ ] Palpitations [ ] Cough [ ] N/V/C/D [ ] Paresthia [ ] Other     [x ] ROS  otherwise negative    .    PHYSICAL EXAM:    Vital Signs Last 24 Hrs  T(C): 36.8 (02 Jul 2018 12:35), Max: 36.9 (01 Jul 2018 22:15)  T(F): 98.3 (02 Jul 2018 12:35), Max: 98.5 (02 Jul 2018 09:45)  HR: 64 (02 Jul 2018 11:53) (58 - 76)  BP: 120/71 (02 Jul 2018 11:53) (86/41 - 161/61)  BP(mean): 90 (02 Jul 2018 11:53) (52 - 90)  RR: 16 (02 Jul 2018 11:53) (15 - 45)  SpO2: 100% (02 Jul 2018 11:53) (94% - 100%)    I&O's Detail    01 Jul 2018 07:01  -  02 Jul 2018 07:00  --------------------------------------------------------  IN:    Oral Fluid: 180 mL  Total IN: 180 mL    OUT:    Voided: 1375 mL  Total OUT: 1375 mL    Total NET: -1195 mL           CAPILLARY BLOOD GLUCOSE                      Neuro: AAOX3    Lungs: CTA, nonlabored, Spo2 % on RA    CV: hr 60s    ABD: soft, nontender    Ext: RLE  Dressing: C/D/I  Motor: 5/5 GS/TA/EHL/FHL    Sensation: SILT s/sp/dp/tib  Pulses:  DP/PT 2+ , toes/foot WWP, R LE nvid motor 5/5, ice cryocuff implemented    LABS                        10.2   7.8   )-----------( 225      ( 02 Jul 2018 11:44 )             31.1                              PT/INR - ( 02 Jul 2018 07:27 )   PT: 19.4 sec;   INR: 1.73          PTT - ( 02 Jul 2018 07:27 )  PTT:33.9 sec  07-02    142  |  102  |  16  ----------------------------<  108<H>  4.3   |  26  |  0.85    Ca    9.4      02 Jul 2018 11:24        [ ] Other Labs  [ ] None ordered            Please check or [place] when present:  •  Heart Failure:    [ ] Acute        [ ]  Acute on Chronic        [ ] Chronic         [ ] Diastolic     [ ]  Combined    •  JONATHAN:     [ ] ATN        [ ]  Renal medullary necrosis       [ ]  Renal cortical necrosis                  [ ] Other pathological Lesion:  •  CKD:  [ ] Stage I   [ ] Stage II  [ ] Stage III    [ ]Stage IV   [ ]  CKD V   [ ]  Other/Unspecified:    •  Abdominal Nutritional Status:   [ ] Malnutrition-See Nutrition note    [ ] Cachexia   [ ]  Other        [ ] Supplement ordered:            [ ] Morbid Obesity: BMI >=40         ASSESSMENT/PLAN:      STATUS POST: R TKA pod4  tolerated PT with ambulation and stair.  Denies respiratory symptoms  bilateral PE- appreciate pulmonary recommendations for clearance  pain control  bowel regimen    CONTINUE:          [ ] PT- wbat    [ ] DVT PPX- scd, eliquis 10mg q 12 x14 days    [ ] Pain Mgt- po meds    [ ] Dispo plan- home

## 2018-07-02 NOTE — DIETITIAN INITIAL EVALUATION ADULT. - ENERGY NEEDS
Ht:5ft 8inches,IBW:140lbs +/-10%,BMI:32.9,154% of IBW.Decreased kcal needs due to obesity.Increased protein needs due to s/p surgery.

## 2018-07-02 NOTE — PROGRESS NOTE ADULT - SUBJECTIVE AND OBJECTIVE BOX
Interval Events: Reviewed  Patient seen and examined at bedside.    Patient is a 73y old  Female who presents with a chief complaint of right knee pain/ right total knee replacement 6/27/18 (28 Jun 2018 12:56)  The patient is doing better she is not short of breath and no bleeding from the gums	  PAST MEDICAL & SURGICAL HISTORY:  Migraines  OA (osteoarthritis)  Hyperlipidemia  Contracture of right ankle      MEDICATIONS:  Pulmonary:  ALBUTerol/ipratropium for Nebulization 3 milliLiter(s) Nebulizer every 6 hours PRN    Antimicrobials:    Anticoagulants:  apixaban 10 milliGRAM(s) Oral every 12 hours    Cardiac:      Allergies    No Known Allergies    Intolerances        Vital Signs Last 24 Hrs  T(C): 37.1 (03 Jul 2018 10:00), Max: 37.1 (03 Jul 2018 10:00)  T(F): 98.8 (03 Jul 2018 10:00), Max: 98.8 (03 Jul 2018 10:00)  HR: 70 (03 Jul 2018 08:59) (64 - 70)  BP: 146/54 (03 Jul 2018 08:59) (128/60 - 146/54)  BP(mean): 77 (03 Jul 2018 08:59) (77 - 100)  RR: 18 (03 Jul 2018 08:59) (18 - 18)  SpO2: 99% (03 Jul 2018 08:59) (96% - 99%)    07-03 @ 07:01  -  07-03 @ 20:00  --------------------------------------------------------  IN: 237 mL / OUT: 0 mL / NET: 237 mL          LABS:      CBC Full  -  ( 03 Jul 2018 06:11 )  WBC Count : 8.4 K/uL  Hemoglobin : 10.5 g/dL  Hematocrit : 32.9 %  Platelet Count - Automated : 245 K/uL  Mean Cell Volume : 95.1 fL  Mean Cell Hemoglobin : 30.3 pg  Mean Cell Hemoglobin Concentration : 31.9 g/dL  Auto Neutrophil # : x  Auto Lymphocyte # : x  Auto Monocyte # : x  Auto Eosinophil # : x  Auto Basophil # : x  Auto Neutrophil % : x  Auto Lymphocyte % : x  Auto Monocyte % : x  Auto Eosinophil % : x  Auto Basophil % : x    07-02    142  |  102  |  16  ----------------------------<  108<H>  4.3   |  26  |  0.85    Ca    9.4      02 Jul 2018 11:24      PT/INR - ( 02 Jul 2018 07:27 )   PT: 19.4 sec;   INR: 1.73          PTT - ( 02 Jul 2018 07:27 )  PTT:33.9 sec                    RADIOLOGY & ADDITIONAL STUDIES (The following images were personally reviewed):  Lloyd:                                     No  Urine output:                       adequate  DVT prophylaxis:                 Yes  Flattus:                                  Yes  Bowel movement:              No

## 2018-07-03 VITALS — TEMPERATURE: 99 F

## 2018-07-03 DIAGNOSIS — R91.1 SOLITARY PULMONARY NODULE: ICD-10-CM

## 2018-07-03 LAB
HCT VFR BLD CALC: 32.9 % — LOW (ref 34.5–45)
HGB BLD-MCNC: 10.5 G/DL — LOW (ref 11.5–15.5)
MCHC RBC-ENTMCNC: 30.3 PG — SIGNIFICANT CHANGE UP (ref 27–34)
MCHC RBC-ENTMCNC: 31.9 G/DL — LOW (ref 32–36)
MCV RBC AUTO: 95.1 FL — SIGNIFICANT CHANGE UP (ref 80–100)
PLATELET # BLD AUTO: 245 K/UL — SIGNIFICANT CHANGE UP (ref 150–400)
RBC # BLD: 3.46 M/UL — LOW (ref 3.8–5.2)
RBC # FLD: 13 % — SIGNIFICANT CHANGE UP (ref 10.3–16.9)
WBC # BLD: 8.4 K/UL — SIGNIFICANT CHANGE UP (ref 3.8–10.5)
WBC # FLD AUTO: 8.4 K/UL — SIGNIFICANT CHANGE UP (ref 3.8–10.5)

## 2018-07-03 PROCEDURE — 88300 SURGICAL PATH GROSS: CPT

## 2018-07-03 PROCEDURE — C1776: CPT

## 2018-07-03 PROCEDURE — 80048 BASIC METABOLIC PNL TOTAL CA: CPT

## 2018-07-03 PROCEDURE — 85610 PROTHROMBIN TIME: CPT

## 2018-07-03 PROCEDURE — 73560 X-RAY EXAM OF KNEE 1 OR 2: CPT

## 2018-07-03 PROCEDURE — 83880 ASSAY OF NATRIURETIC PEPTIDE: CPT

## 2018-07-03 PROCEDURE — 99233 SBSQ HOSP IP/OBS HIGH 50: CPT | Mod: GC

## 2018-07-03 PROCEDURE — 93306 TTE W/DOPPLER COMPLETE: CPT

## 2018-07-03 PROCEDURE — 71045 X-RAY EXAM CHEST 1 VIEW: CPT

## 2018-07-03 PROCEDURE — 84484 ASSAY OF TROPONIN QUANT: CPT

## 2018-07-03 PROCEDURE — 85027 COMPLETE CBC AUTOMATED: CPT

## 2018-07-03 PROCEDURE — 85730 THROMBOPLASTIN TIME PARTIAL: CPT

## 2018-07-03 PROCEDURE — 36415 COLL VENOUS BLD VENIPUNCTURE: CPT

## 2018-07-03 PROCEDURE — 94640 AIRWAY INHALATION TREATMENT: CPT

## 2018-07-03 PROCEDURE — C1713: CPT

## 2018-07-03 PROCEDURE — 97161 PT EVAL LOW COMPLEX 20 MIN: CPT

## 2018-07-03 PROCEDURE — 71275 CT ANGIOGRAPHY CHEST: CPT

## 2018-07-03 PROCEDURE — 97116 GAIT TRAINING THERAPY: CPT

## 2018-07-03 RX ORDER — APIXABAN 2.5 MG/1
1 TABLET, FILM COATED ORAL
Qty: 204 | Refills: 0 | OUTPATIENT
Start: 2018-07-03 | End: 2018-08-01

## 2018-07-03 RX ORDER — MELOXICAM 15 MG/1
0 TABLET ORAL
Qty: 0 | Refills: 0 | COMMUNITY

## 2018-07-03 RX ADMIN — HYDROMORPHONE HYDROCHLORIDE 0.5 MILLIGRAM(S): 2 INJECTION INTRAMUSCULAR; INTRAVENOUS; SUBCUTANEOUS at 10:52

## 2018-07-03 RX ADMIN — Medication 975 MILLIGRAM(S): at 08:00

## 2018-07-03 RX ADMIN — POLYETHYLENE GLYCOL 3350 17 GRAM(S): 17 POWDER, FOR SOLUTION ORAL at 09:45

## 2018-07-03 RX ADMIN — Medication 100 MILLIGRAM(S): at 06:38

## 2018-07-03 RX ADMIN — Medication 975 MILLIGRAM(S): at 13:41

## 2018-07-03 RX ADMIN — APIXABAN 10 MILLIGRAM(S): 2.5 TABLET, FILM COATED ORAL at 06:38

## 2018-07-03 RX ADMIN — HYDROMORPHONE HYDROCHLORIDE 0.5 MILLIGRAM(S): 2 INJECTION INTRAMUSCULAR; INTRAVENOUS; SUBCUTANEOUS at 10:06

## 2018-07-03 RX ADMIN — Medication 975 MILLIGRAM(S): at 14:00

## 2018-07-03 RX ADMIN — PANTOPRAZOLE SODIUM 40 MILLIGRAM(S): 20 TABLET, DELAYED RELEASE ORAL at 09:44

## 2018-07-03 RX ADMIN — Medication 100 MILLIGRAM(S): at 13:40

## 2018-07-03 RX ADMIN — Medication 975 MILLIGRAM(S): at 06:39

## 2018-07-03 NOTE — PROGRESS NOTE ADULT - PROBLEM SELECTOR PROBLEM 2
Right-sided heart failure, unspecified HF chronicity
Hyperlipidemia
Right-sided heart failure, unspecified HF chronicity

## 2018-07-03 NOTE — PROGRESS NOTE ADULT - PROVIDER SPECIALTY LIST ADULT
Orthopedics
Pain Medicine
Pulmonology
Pain Medicine

## 2018-07-03 NOTE — PROGRESS NOTE ADULT - EXTREMITIES
No cyanosis, clubbing or edema
detailed exam
No cyanosis, clubbing or edema
detailed exam

## 2018-07-03 NOTE — PROGRESS NOTE ADULT - SUBJECTIVE AND OBJECTIVE BOX
Ortho Progress Note    Subjective:  73y Female s/p R TKA ,  Patient seen and examined, doing well, pain endorsed but controlled. Had SOB/CP and Desatted w/ PT yesterday. CTA PE Protcol performed and + for b/l PE. Started Hep gtt per Dr. Salazar, will switch to apixaban today. Pt stable this am, feeling better Denies CP/SOB/N/V      Objective:    Vital Signs Last 24 Hrs  T(C): 36.2 (03 Jul 2018 06:06), Max: 36.9 (02 Jul 2018 09:45)  T(F): 97.2 (03 Jul 2018 06:06), Max: 98.5 (02 Jul 2018 09:45)  HR: 70 (03 Jul 2018 05:40) (58 - 74)  BP: 128/60 (03 Jul 2018 05:40) (86/41 - 137/74)  BP(mean): 89 (03 Jul 2018 05:40) (52 - 100)  RR: 18 (03 Jul 2018 05:40) (15 - 18)  SpO2: 97% (03 Jul 2018 05:40) (96% - 100%)    NAD, AOx3, comfortable    RLE  Dressing: C/D/I  Motor: 5/5 GS/TA/EHL/FHL    Sensation: SILT s/sp/dp/tib  Pulses:  DP/PT 2+ , toes/foot WWP                       A/P:  73y Female s/p above procedure , POD# 6  -Stable  -Pain Control  -PT/WBAT  -DVT ppx: Heparin gtt -> apixaban  -Advance diet as tolerated  -f/u AM labs  -f/u Echo  -f/u recs per Dr. Salazar  -Dispo: home once cleared by sarah Street MD, PGY-2  [phone]

## 2018-07-03 NOTE — PROGRESS NOTE ADULT - PROBLEM SELECTOR PROBLEM 3
Pulmonary hypertension
OA (osteoarthritis)
Pulmonary hypertension

## 2018-07-03 NOTE — PROGRESS NOTE ADULT - PROBLEM SELECTOR PLAN 1
The patient is on heparin. The heparin drip was adjusted. No evidence of bleeding from the knee. The patient stable tomorrow will change to oral agent. Normal indication for catheter directed intervention. Echocardiogram revealed dilated and hypokinetic right ventricle.
The patient is off heparin. She was started on Apixaban,.  Continue on monitor can start PT
The patient is off heparin. She was started on Apixaban,.  Continue on monitor.The patient exercised capacity improved. She was able to do physical therapy and go upstairs without having tachycardia nor desaturation. Patient to continue on anticoagulation for a total of 6 months.
The patient is off heparin. She was started on Apixaban,.  Continue on monitor.The patient exercised capacity improved. She was able to do physical therapy and go upstairs without having tachycardia nor desaturation. Patient to continue on anticoagulation for a total of 6 months.  I gave the patient instructions. She will require echocardiogram in 3 months and repeat CT scan of the chest in 6 months. No indication for home oxygen. Patient will fall in the office on July 10. I gave the patient caught her off the CT scan and echocardiogram.  Patient had a recent mammogram and a colonoscopy and were negative. I informed the patient that she minute hematological workup prior to discontinuing the anticoagulation

## 2018-07-03 NOTE — PROGRESS NOTE ADULT - PROBLEM SELECTOR PROBLEM 1
Other acute pulmonary embolism with acute cor pulmonale
Migraines
Other acute pulmonary embolism with acute cor pulmonale

## 2018-07-03 NOTE — PROGRESS NOTE ADULT - SUBJECTIVE AND OBJECTIVE BOX
Interval Events: Reviewed  Patient seen and examined at bedside.    Patient is a 73y old  Female who presents with a chief complaint of right knee pain/ right total knee replacement 6/27/18 (28 Jun 2018 12:56)    She is doing okay on whether to go home. She did well with physical therapy again and does not have any chest pain  PAST MEDICAL & SURGICAL HISTORY:  Migraines  OA (osteoarthritis)  Hyperlipidemia  Contracture of right ankle      MEDICATIONS:  Pulmonary:  ALBUTerol/ipratropium for Nebulization 3 milliLiter(s) Nebulizer every 6 hours PRN    Antimicrobials:    Anticoagulants:  apixaban 10 milliGRAM(s) Oral every 12 hours    Cardiac:      Allergies    No Known Allergies    Intolerances        Vital Signs Last 24 Hrs  T(C): 37.1 (03 Jul 2018 10:00), Max: 37.1 (03 Jul 2018 10:00)  T(F): 98.8 (03 Jul 2018 10:00), Max: 98.8 (03 Jul 2018 10:00)  HR: 70 (03 Jul 2018 08:59) (64 - 70)  BP: 146/54 (03 Jul 2018 08:59) (128/60 - 146/54)  BP(mean): 77 (03 Jul 2018 08:59) (77 - 100)  RR: 18 (03 Jul 2018 08:59) (18 - 18)  SpO2: 99% (03 Jul 2018 08:59) (96% - 99%)    07-03 @ 07:01  -  07-03 @ 20:02  --------------------------------------------------------  IN: 237 mL / OUT: 0 mL / NET: 237 mL          LABS:      CBC Full  -  ( 03 Jul 2018 06:11 )  WBC Count : 8.4 K/uL  Hemoglobin : 10.5 g/dL  Hematocrit : 32.9 %  Platelet Count - Automated : 245 K/uL  Mean Cell Volume : 95.1 fL  Mean Cell Hemoglobin : 30.3 pg  Mean Cell Hemoglobin Concentration : 31.9 g/dL  Auto Neutrophil # : x  Auto Lymphocyte # : x  Auto Monocyte # : x  Auto Eosinophil # : x  Auto Basophil # : x  Auto Neutrophil % : x  Auto Lymphocyte % : x  Auto Monocyte % : x  Auto Eosinophil % : x  Auto Basophil % : x    07-02    142  |  102  |  16  ----------------------------<  108<H>  4.3   |  26  |  0.85    Ca    9.4      02 Jul 2018 11:24      PT/INR - ( 02 Jul 2018 07:27 )   PT: 19.4 sec;   INR: 1.73          PTT - ( 02 Jul 2018 07:27 )  PTT:33.9 sec                    RADIOLOGY & ADDITIONAL STUDIES (The following images were personally reviewed):  Lloyd:                                     No  Urine output:                       adequate  DVT prophylaxis:                 Yes  Flattus:                                  Yes  Bowel movement:              No

## 2018-07-03 NOTE — PROGRESS NOTE ADULT - ATTENDING COMMENTS
Patient seen and examined with house-staff during bedside rounds.  Resident note read, including vitals, physical findings, laboratory data, and radiological reports.   Revisions included below.  Direct personal management at bed side and extensive interpretation of the data.  Plan was outlined and discussed in details with the housestaff.  Decision making of high complexity  Action taken for acute disease activity to reflect the level of care provided:  - medication reconciliation  - review laboratory data

## 2018-07-03 NOTE — PROGRESS NOTE ADULT - ASSESSMENT
73 years old female POD#1 status post Right Total Knee Replacement, opioid naive, complaints of post-surgical pain.      Pain Management   - recommend decreased oxycodone 10mg to oxycodone 5mg q2 prn for moderate to severe pain.   - continue with Tylenol and Celebrex 200mg, as scheduled   - may consider additional adjuvant medication gabapentin 100-200mg at bedtime for 3-4 weeks. Then taper off     Opioid Induced constipation  - recommend adding dulcolax 5-10mg daily  - encourage increased oral hydration and progressive mobility     Opioid induced nausea   - continue with zofran, as needed     Anticipate improved pain with progressive mobility and ambulation.     Continue with physical therapy     On discharge, recommend Percocet 5/325mg q4 prn for moderate to severe pain.     Plan discussed with inpatient team and ortho PA Ervin).     Susan Oates M.D.   [phone]
74 y/o lady who is s/p Rt knee replacement is found to have b/l PE
72 y/o lady who is s/p Rt knee replacement is found to have b/l PE
74 y/o lady who is s/p Rt knee replacement is found to have b/l PE
73 years old female POD#2 status post Right Total Knee Replacement, opioid naive, followed for post-surgical pain.  Pending work-up for dyspnea on excretion. Pain stable at rest.     Pain Management   - recommend oxycodone 5mg and Tylenol 500mg tablet about 30min before patient attempts to ambulate, especially in the morning.   - continue with oxycodone 5mg q2 prn for moderate to severe pain.   - continue with Celebrex 200mg, as scheduled   - may consider additional adjuvant medication gabapentin 100-200mg at bedtime for 3-4 weeks. Then taper off     Opioid Induced constipation  - recommend adding dulcolax 5-10mg daily,   - encourage increased oral hydration and progressive mobility     Opioid induced nausea   - continue with IV zofran, as needed     Continue with physical therapy, once medically cleared.   Anticipate improved pain with progressive mobility and ambulation, once medically cleared.     On discharge, recommend Percocet 5/325mg q4 prn for moderate to severe pain.     Susan Oates M.D.   [phone]
74 y/o lady who is s/p Rt knee replacement is found to have b/l PE

## 2018-07-03 NOTE — PROGRESS NOTE ADULT - CONSTITUTIONAL
detailed exam
Well-developed, well nourished
detailed exam
Well-developed, well nourished

## 2018-07-03 NOTE — PROGRESS NOTE ADULT - RESPIRATORY
Breath Sounds equal & clear to percussion & auscultation, no accessory muscle use
detailed exam

## 2018-07-03 NOTE — PROGRESS NOTE ADULT - PROBLEM SELECTOR PLAN 3
Is related to thromboembolic disease.

## 2018-07-06 ENCOUNTER — RX RENEWAL (OUTPATIENT)
Age: 73
End: 2018-07-06

## 2018-07-09 PROBLEM — E78.5 HYPERLIPIDEMIA, UNSPECIFIED: Chronic | Status: ACTIVE | Noted: 2018-06-26

## 2018-07-09 PROBLEM — G43.909 MIGRAINE, UNSPECIFIED, NOT INTRACTABLE, WITHOUT STATUS MIGRAINOSUS: Chronic | Status: ACTIVE | Noted: 2018-06-26

## 2018-07-09 PROBLEM — M19.90 UNSPECIFIED OSTEOARTHRITIS, UNSPECIFIED SITE: Chronic | Status: ACTIVE | Noted: 2018-06-26

## 2018-07-12 ENCOUNTER — APPOINTMENT (OUTPATIENT)
Dept: FAMILY MEDICINE | Facility: CLINIC | Age: 73
End: 2018-07-12
Payer: MEDICARE

## 2018-07-12 VITALS
DIASTOLIC BLOOD PRESSURE: 80 MMHG | TEMPERATURE: 98.8 F | HEIGHT: 68 IN | OXYGEN SATURATION: 98 % | SYSTOLIC BLOOD PRESSURE: 124 MMHG | WEIGHT: 215 LBS | HEART RATE: 71 BPM | BODY MASS INDEX: 32.58 KG/M2

## 2018-07-12 PROCEDURE — 36415 COLL VENOUS BLD VENIPUNCTURE: CPT

## 2018-07-12 PROCEDURE — 99214 OFFICE O/P EST MOD 30 MIN: CPT | Mod: 25

## 2018-07-12 NOTE — ASSESSMENT
[FreeTextEntry1] : Diarrhea - check stool cx, cdiff, stay hydrated. check electrolytes\par fatigue likely 2/2 recent hx of PE - cont eliquis bid.check cbc, cmp

## 2018-07-12 NOTE — HISTORY OF PRESENT ILLNESS
[FreeTextEntry8] : Pt dx w/ PE in [place] after R knee replacement. Pt discharged on 7/3/18 on eliquis bid. Pt then started having watery nonbloody diarrhea x 7 days. Pt having 4-5 episodes/day. Denies abd pain. Pt denies being on antibiotics recently.

## 2018-07-13 DIAGNOSIS — K21.9 GASTRO-ESOPHAGEAL REFLUX DISEASE WITHOUT ESOPHAGITIS: ICD-10-CM

## 2018-07-13 DIAGNOSIS — G43.909 MIGRAINE, UNSPECIFIED, NOT INTRACTABLE, WITHOUT STATUS MIGRAINOSUS: ICD-10-CM

## 2018-07-13 DIAGNOSIS — I50.9 HEART FAILURE, UNSPECIFIED: ICD-10-CM

## 2018-07-13 DIAGNOSIS — M17.10 UNILATERAL PRIMARY OSTEOARTHRITIS, UNSPECIFIED KNEE: ICD-10-CM

## 2018-07-13 DIAGNOSIS — I27.20 PULMONARY HYPERTENSION, UNSPECIFIED: ICD-10-CM

## 2018-07-13 DIAGNOSIS — E78.1 PURE HYPERGLYCERIDEMIA: ICD-10-CM

## 2018-07-13 DIAGNOSIS — R00.1 BRADYCARDIA, UNSPECIFIED: ICD-10-CM

## 2018-07-13 DIAGNOSIS — I26.09 OTHER PULMONARY EMBOLISM WITH ACUTE COR PULMONALE: ICD-10-CM

## 2018-07-13 DIAGNOSIS — M17.11 UNILATERAL PRIMARY OSTEOARTHRITIS, RIGHT KNEE: ICD-10-CM

## 2018-07-16 LAB
ALBUMIN SERPL ELPH-MCNC: 4.4 G/DL
ALP BLD-CCNC: 111 U/L
ALT SERPL-CCNC: 7 U/L
ANION GAP SERPL CALC-SCNC: 14 MMOL/L
AST SERPL-CCNC: 16 U/L
BACTERIA STL CULT: NORMAL
BASOPHILS # BLD AUTO: 0.02 K/UL
BASOPHILS NFR BLD AUTO: 0.2 %
BILIRUB SERPL-MCNC: 0.2 MG/DL
BUN SERPL-MCNC: 13 MG/DL
C DIFF TOX GENS STL QL NAA+PROBE: NORMAL
CALCIUM SERPL-MCNC: 9.8 MG/DL
CDIFF BY PCR: NOT DETECTED
CHLORIDE SERPL-SCNC: 102 MMOL/L
CO2 SERPL-SCNC: 24 MMOL/L
CREAT SERPL-MCNC: 0.81 MG/DL
EOSINOPHIL # BLD AUTO: 0.25 K/UL
EOSINOPHIL NFR BLD AUTO: 2.4 %
GLUCOSE SERPL-MCNC: 91 MG/DL
HCT VFR BLD CALC: 36 %
HGB BLD-MCNC: 11.4 G/DL
IMM GRANULOCYTES NFR BLD AUTO: 0.4 %
LYMPHOCYTES # BLD AUTO: 2.08 K/UL
LYMPHOCYTES NFR BLD AUTO: 20 %
MAN DIFF?: NORMAL
MCHC RBC-ENTMCNC: 30.6 PG
MCHC RBC-ENTMCNC: 31.7 GM/DL
MCV RBC AUTO: 96.8 FL
MONOCYTES # BLD AUTO: 0.46 K/UL
MONOCYTES NFR BLD AUTO: 4.4 %
NEUTROPHILS # BLD AUTO: 7.55 K/UL
NEUTROPHILS NFR BLD AUTO: 72.6 %
PLATELET # BLD AUTO: 494 K/UL
POTASSIUM SERPL-SCNC: 4.2 MMOL/L
PROT SERPL-MCNC: 7.3 G/DL
RBC # BLD: 3.72 M/UL
RBC # FLD: 13.9 %
SODIUM SERPL-SCNC: 140 MMOL/L
WBC # FLD AUTO: 10.4 K/UL

## 2018-07-17 LAB — DEPRECATED O AND P PREP STL: NORMAL

## 2018-07-25 ENCOUNTER — APPOINTMENT (OUTPATIENT)
Dept: FAMILY MEDICINE | Facility: CLINIC | Age: 73
End: 2018-07-25

## 2018-08-02 ENCOUNTER — APPOINTMENT (OUTPATIENT)
Dept: PULMONOLOGY | Facility: CLINIC | Age: 73
End: 2018-08-02

## 2018-08-03 ENCOUNTER — APPOINTMENT (OUTPATIENT)
Dept: FAMILY MEDICINE | Facility: CLINIC | Age: 73
End: 2018-08-03
Payer: MEDICARE

## 2018-08-03 DIAGNOSIS — Z96.651 PRESENCE OF RIGHT ARTIFICIAL KNEE JOINT: ICD-10-CM

## 2018-08-03 PROCEDURE — 99214 OFFICE O/P EST MOD 30 MIN: CPT

## 2018-08-03 NOTE — ASSESSMENT
[FreeTextEntry1] : Patient to followup in September to consider further treatment with Eliquis\par \par Ranitidine 150 twice a day for gastric ulcer.\par \par C. Differential has resolved. Warned of recurrence to avoid antibiotics unless absolutely necessary. Continue probiotic

## 2018-08-03 NOTE — HISTORY OF PRESENT ILLNESS
[FreeTextEntry1] : Followup on postop pulmonary embolus, and acute gastric ulcer [de-identified] : Patient had right total knee replacement, June 25 postop course complicated by pulmonary embolus. Patient currently on Eliquis. Stools are brown. Postop course also complicated by C. difficile treated with deficid , and diarrhea has resolved. \par \par Preop EGD June 14 showed gastric ulcers. Patient treated with Dexipant. GI recommends changing to ranitidine\par \par Patient currently feels well. He is rehabbing. No further diarrhea. No shortness of breath

## 2018-09-11 ENCOUNTER — RX RENEWAL (OUTPATIENT)
Age: 73
End: 2018-09-11

## 2018-09-24 ENCOUNTER — APPOINTMENT (OUTPATIENT)
Dept: FAMILY MEDICINE | Facility: CLINIC | Age: 73
End: 2018-09-24

## 2018-10-17 ENCOUNTER — APPOINTMENT (OUTPATIENT)
Dept: PULMONOLOGY | Facility: CLINIC | Age: 73
End: 2018-10-17
Payer: MEDICARE

## 2018-10-17 VITALS
DIASTOLIC BLOOD PRESSURE: 90 MMHG | HEART RATE: 67 BPM | SYSTOLIC BLOOD PRESSURE: 130 MMHG | WEIGHT: 210 LBS | HEIGHT: 68 IN | BODY MASS INDEX: 31.83 KG/M2 | OXYGEN SATURATION: 96 % | TEMPERATURE: 98.6 F

## 2018-10-17 DIAGNOSIS — Z82.49 FAMILY HISTORY OF ISCHEMIC HEART DISEASE AND OTHER DISEASES OF THE CIRCULATORY SYSTEM: ICD-10-CM

## 2018-10-17 PROCEDURE — 99214 OFFICE O/P EST MOD 30 MIN: CPT

## 2018-10-23 ENCOUNTER — OUTPATIENT (OUTPATIENT)
Dept: OUTPATIENT SERVICES | Facility: HOSPITAL | Age: 73
LOS: 1 days | End: 2018-10-23
Payer: MEDICARE

## 2018-10-23 DIAGNOSIS — M24.571 CONTRACTURE, RIGHT ANKLE: Chronic | ICD-10-CM

## 2018-10-23 DIAGNOSIS — I27.0 PRIMARY PULMONARY HYPERTENSION: ICD-10-CM

## 2018-10-23 PROCEDURE — 93325 DOPPLER ECHO COLOR FLOW MAPG: CPT | Mod: 26

## 2018-10-23 PROCEDURE — 93018 CV STRESS TEST I&R ONLY: CPT

## 2018-10-23 PROCEDURE — 93350 STRESS TTE ONLY: CPT | Mod: 26

## 2018-10-23 PROCEDURE — 93016 CV STRESS TEST SUPVJ ONLY: CPT

## 2018-10-23 PROCEDURE — 93320 DOPPLER ECHO COMPLETE: CPT | Mod: 26

## 2018-10-23 PROCEDURE — 93351 STRESS TTE COMPLETE: CPT

## 2018-11-19 ENCOUNTER — RX RENEWAL (OUTPATIENT)
Age: 73
End: 2018-11-19

## 2018-11-28 ENCOUNTER — RX RENEWAL (OUTPATIENT)
Age: 73
End: 2018-11-28

## 2018-12-08 ENCOUNTER — OUTPATIENT (OUTPATIENT)
Dept: OUTPATIENT SERVICES | Facility: HOSPITAL | Age: 73
LOS: 1 days | End: 2018-12-08
Payer: MEDICARE

## 2018-12-08 ENCOUNTER — APPOINTMENT (OUTPATIENT)
Dept: CT IMAGING | Facility: HOSPITAL | Age: 73
End: 2018-12-08
Payer: MEDICARE

## 2018-12-08 DIAGNOSIS — M24.571 CONTRACTURE, RIGHT ANKLE: Chronic | ICD-10-CM

## 2018-12-08 PROCEDURE — 71250 CT THORAX DX C-: CPT

## 2018-12-08 PROCEDURE — 71250 CT THORAX DX C-: CPT | Mod: 26

## 2018-12-13 ENCOUNTER — APPOINTMENT (OUTPATIENT)
Dept: PULMONOLOGY | Facility: CLINIC | Age: 73
End: 2018-12-13
Payer: MEDICARE

## 2018-12-13 VITALS
TEMPERATURE: 97.8 F | WEIGHT: 216 LBS | DIASTOLIC BLOOD PRESSURE: 78 MMHG | BODY MASS INDEX: 32.74 KG/M2 | HEART RATE: 68 BPM | OXYGEN SATURATION: 97 % | HEIGHT: 68 IN | SYSTOLIC BLOOD PRESSURE: 128 MMHG

## 2018-12-13 PROCEDURE — 94618 PULMONARY STRESS TESTING: CPT

## 2018-12-13 PROCEDURE — 99214 OFFICE O/P EST MOD 30 MIN: CPT | Mod: 25

## 2019-04-09 ENCOUNTER — TRANSCRIPTION ENCOUNTER (OUTPATIENT)
Age: 74
End: 2019-04-09

## 2019-04-25 ENCOUNTER — RX RENEWAL (OUTPATIENT)
Age: 74
End: 2019-04-25

## 2019-04-29 ENCOUNTER — FORM ENCOUNTER (OUTPATIENT)
Age: 74
End: 2019-04-29

## 2019-04-30 ENCOUNTER — OUTPATIENT (OUTPATIENT)
Dept: OUTPATIENT SERVICES | Facility: HOSPITAL | Age: 74
LOS: 1 days | End: 2019-04-30
Payer: MEDICARE

## 2019-04-30 DIAGNOSIS — M24.571 CONTRACTURE, RIGHT ANKLE: Chronic | ICD-10-CM

## 2019-04-30 PROCEDURE — A9567: CPT

## 2019-04-30 PROCEDURE — 78582 LUNG VENTILAT&PERFUS IMAGING: CPT | Mod: 26

## 2019-04-30 PROCEDURE — 78582 LUNG VENTILAT&PERFUS IMAGING: CPT

## 2019-04-30 PROCEDURE — A9540: CPT

## 2019-05-22 ENCOUNTER — RX RENEWAL (OUTPATIENT)
Age: 74
End: 2019-05-22

## 2019-07-05 ENCOUNTER — RX RENEWAL (OUTPATIENT)
Age: 74
End: 2019-07-05

## 2019-07-10 ENCOUNTER — APPOINTMENT (OUTPATIENT)
Dept: PULMONOLOGY | Facility: CLINIC | Age: 74
End: 2019-07-10

## 2019-07-18 ENCOUNTER — APPOINTMENT (OUTPATIENT)
Dept: PULMONOLOGY | Facility: CLINIC | Age: 74
End: 2019-07-18

## 2019-07-24 ENCOUNTER — APPOINTMENT (OUTPATIENT)
Dept: FAMILY MEDICINE | Facility: CLINIC | Age: 74
End: 2019-07-24
Payer: MEDICARE

## 2019-07-24 VITALS
HEART RATE: 68 BPM | HEIGHT: 68 IN | OXYGEN SATURATION: 95 % | BODY MASS INDEX: 33.65 KG/M2 | DIASTOLIC BLOOD PRESSURE: 80 MMHG | WEIGHT: 222 LBS | SYSTOLIC BLOOD PRESSURE: 124 MMHG

## 2019-07-24 PROCEDURE — G0444 DEPRESSION SCREEN ANNUAL: CPT

## 2019-07-24 PROCEDURE — G0439: CPT | Mod: 25

## 2019-07-24 RX ORDER — LOPERAMIDE HYDROCHLORIDE 2 MG/1
2 CAPSULE ORAL
Qty: 60 | Refills: 1 | Status: DISCONTINUED | COMMUNITY
Start: 2018-07-16 | End: 2019-07-24

## 2019-07-24 RX ORDER — CALCIUM CARBONATE 500(1250)
500 TABLET ORAL DAILY
Refills: 0 | Status: DISCONTINUED | COMMUNITY
End: 2019-07-24

## 2019-07-24 RX ORDER — CHLORHEXIDINE GLUCONATE 4 %
250 LIQUID (ML) TOPICAL
Refills: 0 | Status: DISCONTINUED | COMMUNITY
End: 2019-07-24

## 2019-07-24 RX ORDER — DEXLANSOPRAZOLE 60 MG/1
60 CAPSULE, DELAYED RELEASE ORAL DAILY
Qty: 90 | Refills: 3 | Status: DISCONTINUED | COMMUNITY
End: 2019-07-24

## 2019-07-24 RX ORDER — OXYCODONE AND ACETAMINOPHEN 5; 325 MG/1; MG/1
5-325 TABLET ORAL
Qty: 40 | Refills: 0 | Status: DISCONTINUED | COMMUNITY
Start: 2018-07-03 | End: 2019-07-24

## 2019-07-24 NOTE — PHYSICAL EXAM
[Normal] : soft, non-tender, non-distended, no masses palpated, no HSM and normal bowel sounds [de-identified] : Mild left lower quadrant tenderness [de-identified] : Sun damage

## 2019-07-24 NOTE — HISTORY OF PRESENT ILLNESS
[de-identified] : Patient has no complaints.  Her total knee replacement last summer was complicated by pulmonary embolus and C. difficile diarrhea all of which has resolved.  Patient currently on metronidazole and Cipro for mild diverticulitis as per Dr. Jones\par \par She sees a dermatologist and pulmonologist\par \par She walks regularly no cardiovascular symptoms [FreeTextEntry1] : Yearly physical

## 2019-07-24 NOTE — HEALTH RISK ASSESSMENT
[Very Good] : ~his/her~  mood as very good [] : No [No falls in past year] : Patient reported no falls in the past year [0] : 2) Feeling down, depressed, or hopeless: Not at all (0) [Patient reported mammogram was normal] : Patient reported mammogram was normal [Fully functional (bathing, dressing, toileting, transferring, walking, feeding)] : Fully functional (bathing, dressing, toileting, transferring, walking, feeding) [Patient reported colonoscopy was normal] : Patient reported colonoscopy was normal [Fully functional (using the telephone, shopping, preparing meals, housekeeping, doing laundry, using] : Fully functional and needs no help or supervision to perform IADLs (using the telephone, shopping, preparing meals, housekeeping, doing laundry, using transportation, managing medications and managing finances) [MammogramDate] : 06/19 [ColonoscopyDate] : 06/17

## 2019-07-24 NOTE — ASSESSMENT
[FreeTextEntry1] : Patient is stable continue current meds check labs follow-up with GI and pulmonary

## 2019-07-26 LAB
25(OH)D3 SERPL-MCNC: 42.9 NG/ML
ALBUMIN SERPL ELPH-MCNC: 4.8 G/DL
ALP BLD-CCNC: 114 U/L
ALT SERPL-CCNC: 17 U/L
ANION GAP SERPL CALC-SCNC: 14 MMOL/L
AST SERPL-CCNC: 16 U/L
BASOPHILS # BLD AUTO: 0.03 K/UL
BASOPHILS NFR BLD AUTO: 0.4 %
BILIRUB SERPL-MCNC: 0.2 MG/DL
BUN SERPL-MCNC: 25 MG/DL
CALCIUM SERPL-MCNC: 10 MG/DL
CHLORIDE SERPL-SCNC: 104 MMOL/L
CHOLEST SERPL-MCNC: 280 MG/DL
CHOLEST/HDLC SERPL: 6.1 RATIO
CO2 SERPL-SCNC: 25 MMOL/L
CREAT SERPL-MCNC: 0.95 MG/DL
EOSINOPHIL # BLD AUTO: 0 K/UL
EOSINOPHIL NFR BLD AUTO: 0 %
ESTIMATED AVERAGE GLUCOSE: 108 MG/DL
GLUCOSE SERPL-MCNC: 95 MG/DL
HBA1C MFR BLD HPLC: 5.4 %
HCT VFR BLD CALC: 42.7 %
HDLC SERPL-MCNC: 46 MG/DL
HGB BLD-MCNC: 13.7 G/DL
IMM GRANULOCYTES NFR BLD AUTO: 0.5 %
LDLC SERPL CALC-MCNC: 179 MG/DL
LYMPHOCYTES # BLD AUTO: 1.38 K/UL
LYMPHOCYTES NFR BLD AUTO: 17.3 %
MAN DIFF?: NORMAL
MCHC RBC-ENTMCNC: 32.1 GM/DL
MCHC RBC-ENTMCNC: 32.2 PG
MCV RBC AUTO: 100.2 FL
MONOCYTES # BLD AUTO: 0.5 K/UL
MONOCYTES NFR BLD AUTO: 6.3 %
NEUTROPHILS # BLD AUTO: 6.03 K/UL
NEUTROPHILS NFR BLD AUTO: 75.5 %
PLATELET # BLD AUTO: 291 K/UL
POTASSIUM SERPL-SCNC: 4.7 MMOL/L
PROT SERPL-MCNC: 6.8 G/DL
RBC # BLD: 4.26 M/UL
RBC # FLD: 12.5 %
SODIUM SERPL-SCNC: 143 MMOL/L
TRIGL SERPL-MCNC: 276 MG/DL
WBC # FLD AUTO: 7.98 K/UL

## 2019-08-26 ENCOUNTER — RX RENEWAL (OUTPATIENT)
Age: 74
End: 2019-08-26

## 2019-09-10 ENCOUNTER — APPOINTMENT (OUTPATIENT)
Dept: FAMILY MEDICINE | Facility: CLINIC | Age: 74
End: 2019-09-10
Payer: MEDICARE

## 2019-09-10 VITALS
DIASTOLIC BLOOD PRESSURE: 80 MMHG | OXYGEN SATURATION: 97 % | HEIGHT: 68 IN | RESPIRATION RATE: 16 BRPM | WEIGHT: 220 LBS | BODY MASS INDEX: 33.34 KG/M2 | HEART RATE: 61 BPM | SYSTOLIC BLOOD PRESSURE: 130 MMHG

## 2019-09-10 LAB
BILIRUB UR QL STRIP: NEGATIVE
GLUCOSE UR-MCNC: NEGATIVE
HCG UR QL: 0.2 EU/DL
HGB UR QL STRIP.AUTO: NEGATIVE
KETONES UR-MCNC: NEGATIVE
LEUKOCYTE ESTERASE UR QL STRIP: NORMAL
NITRITE UR QL STRIP: NEGATIVE
PH UR STRIP: 6
PROT UR STRIP-MCNC: NEGATIVE
SP GR UR STRIP: 1.02

## 2019-09-10 PROCEDURE — 99213 OFFICE O/P EST LOW 20 MIN: CPT | Mod: 25

## 2019-09-10 PROCEDURE — 81003 URINALYSIS AUTO W/O SCOPE: CPT | Mod: QW

## 2019-09-10 NOTE — PLAN
[FreeTextEntry1] : UTI- complete abx, f/u cx. Advise to increase hydration. Can take cranberry supplement

## 2019-09-10 NOTE — HISTORY OF PRESENT ILLNESS
[FreeTextEntry8] : 74 year old female presents with urinary burning, increased frequency and low back pain that started this morning. No fever or chills

## 2019-09-10 NOTE — REVIEW OF SYSTEMS
[Fever] : no fever [Fatigue] : no fatigue [Chills] : no chills [Discharge] : no discharge [Vision Problems] : no vision problems [Palpitations] : no palpitations [Chest Pain] : no chest pain [Shortness Of Breath] : no shortness of breath [Cough] : no cough [Dysuria] : dysuria [Hematuria] : no hematuria [Frequency] : frequency [Back Pain] : back pain

## 2019-09-10 NOTE — PHYSICAL EXAM
[Well-Appearing] : well-appearing [No Acute Distress] : no acute distress [No Respiratory Distress] : no respiratory distress  [Normal Rate] : normal rate  [Regular Rhythm] : with a regular rhythm [No CVA Tenderness] : no CVA  tenderness [Normal Affect] : the affect was normal [Normal Insight/Judgement] : insight and judgment were intact

## 2019-09-12 LAB — BACTERIA UR CULT: NORMAL

## 2019-10-09 ENCOUNTER — APPOINTMENT (OUTPATIENT)
Dept: FAMILY MEDICINE | Facility: CLINIC | Age: 74
End: 2019-10-09
Payer: MEDICARE

## 2019-10-09 VITALS
HEART RATE: 62 BPM | OXYGEN SATURATION: 96 % | RESPIRATION RATE: 16 BRPM | WEIGHT: 218 LBS | DIASTOLIC BLOOD PRESSURE: 90 MMHG | BODY MASS INDEX: 33.04 KG/M2 | SYSTOLIC BLOOD PRESSURE: 140 MMHG | HEIGHT: 68 IN

## 2019-10-09 DIAGNOSIS — Z87.440 PERSONAL HISTORY OF URINARY (TRACT) INFECTIONS: ICD-10-CM

## 2019-10-09 DIAGNOSIS — A04.72 ENTEROCOLITIS DUE TO CLOSTRIDIUM DIFFICILE, NOT SPECIFIED AS RECURRENT: ICD-10-CM

## 2019-10-09 DIAGNOSIS — Z87.898 PERSONAL HISTORY OF OTHER SPECIFIED CONDITIONS: ICD-10-CM

## 2019-10-09 DIAGNOSIS — Z01.818 ENCOUNTER FOR OTHER PREPROCEDURAL EXAMINATION: ICD-10-CM

## 2019-10-09 PROCEDURE — 99213 OFFICE O/P EST LOW 20 MIN: CPT

## 2019-10-09 RX ORDER — SULFAMETHOXAZOLE AND TRIMETHOPRIM 800; 160 MG/1; MG/1
800-160 TABLET ORAL TWICE DAILY
Qty: 14 | Refills: 0 | Status: DISCONTINUED | COMMUNITY
Start: 2019-09-10 | End: 2019-10-09

## 2019-10-09 RX ORDER — MIRABEGRON 25 MG/1
25 TABLET, FILM COATED, EXTENDED RELEASE ORAL
Qty: 7 | Refills: 11 | Status: ACTIVE | COMMUNITY
Start: 2019-10-09

## 2019-10-09 RX ORDER — RANITIDINE 150 MG/1
150 TABLET ORAL
Qty: 180 | Refills: 3 | Status: DISCONTINUED | COMMUNITY
Start: 2018-08-03 | End: 2019-10-09

## 2019-10-09 NOTE — ASSESSMENT
[FreeTextEntry1] : Recheck lipids on simvastatin 40 call patient with results\par \par See me 6 months for blood pressure check

## 2019-10-09 NOTE — HISTORY OF PRESENT ILLNESS
[de-identified] : Patient had been on atorvastatin 20 mg for hyperlipidemia.  Insurance requested change to simvastatin 40 mg to save money.  Patient now needs repeat lipid profile.\par \par Repeat blood pressure was 140/85.  Patient has history of low blood pressure.  No history of white coat syndrome.  Will observe

## 2019-10-10 LAB
CHOLEST SERPL-MCNC: 197 MG/DL
CHOLEST/HDLC SERPL: 4.4 RATIO
HDLC SERPL-MCNC: 45 MG/DL
LDLC SERPL CALC-MCNC: 96 MG/DL
TRIGL SERPL-MCNC: 278 MG/DL

## 2019-12-13 ENCOUNTER — RX RENEWAL (OUTPATIENT)
Age: 74
End: 2019-12-13

## 2020-09-16 ENCOUNTER — LABORATORY RESULT (OUTPATIENT)
Age: 75
End: 2020-09-16

## 2020-09-16 ENCOUNTER — NON-APPOINTMENT (OUTPATIENT)
Age: 75
End: 2020-09-16

## 2020-09-16 ENCOUNTER — APPOINTMENT (OUTPATIENT)
Dept: FAMILY MEDICINE | Facility: CLINIC | Age: 75
End: 2020-09-16
Payer: MEDICARE

## 2020-09-16 VITALS
HEIGHT: 68 IN | DIASTOLIC BLOOD PRESSURE: 92 MMHG | OXYGEN SATURATION: 98 % | SYSTOLIC BLOOD PRESSURE: 126 MMHG | BODY MASS INDEX: 35.46 KG/M2 | HEART RATE: 71 BPM | TEMPERATURE: 97.6 F | WEIGHT: 234 LBS

## 2020-09-16 DIAGNOSIS — Z23 ENCOUNTER FOR IMMUNIZATION: ICD-10-CM

## 2020-09-16 DIAGNOSIS — Z87.898 PERSONAL HISTORY OF OTHER SPECIFIED CONDITIONS: ICD-10-CM

## 2020-09-16 DIAGNOSIS — G43.909 MIGRAINE, UNSPECIFIED, NOT INTRACTABLE, W/OUT STATUS MIGRAINOSUS: ICD-10-CM

## 2020-09-16 DIAGNOSIS — Z87.39 PERSONAL HISTORY OF OTHER DISEASES OF THE MUSCULOSKELETAL SYSTEM AND CONNECTIVE TISSUE: ICD-10-CM

## 2020-09-16 PROCEDURE — 93000 ELECTROCARDIOGRAM COMPLETE: CPT

## 2020-09-16 PROCEDURE — 36415 COLL VENOUS BLD VENIPUNCTURE: CPT

## 2020-09-16 PROCEDURE — 90662 IIV NO PRSV INCREASED AG IM: CPT

## 2020-09-16 PROCEDURE — G0008: CPT

## 2020-09-16 PROCEDURE — G0439: CPT

## 2020-09-16 RX ORDER — BACILLUS COAGULANS/INULIN 1B-250 MG
CAPSULE ORAL
Refills: 0 | Status: DISCONTINUED | COMMUNITY
End: 2020-09-16

## 2020-09-16 NOTE — HISTORY OF PRESENT ILLNESS
[FreeTextEntry1] : Here for annual physical [de-identified] : Patient is doing well has no complaints\par \par Overactive bladder is controlled with Myrbetriq\par \par Migraines are no longer a problem has not needed medication\par \par Osteoarthritis controlled with meloxicam intermittently patient has a history of H. pylori negative cardiac gastric ulcer she will take omeprazole together with meloxicam in the future\par \par Hyperlipidemia now on simvastatin 40\par \par Post total knee pulmonary embolus 2 years ago no dyspnea on exertion

## 2020-09-16 NOTE — HEALTH RISK ASSESSMENT
[] : No [Very Good] : ~his/her~  mood as very good [No] : No [No falls in past year] : Patient reported no falls in the past year [0] : 1) Little interest or pleasure doing things: Not at all (0) [Patient reported mammogram was normal] : Patient reported mammogram was normal [Patient reported colonoscopy was normal] : Patient reported colonoscopy was normal [Change in mental status noted] : No change in mental status noted [] :  [Fully functional (bathing, dressing, toileting, transferring, walking, feeding)] : Fully functional (bathing, dressing, toileting, transferring, walking, feeding) [Fully functional (using the telephone, shopping, preparing meals, housekeeping, doing laundry, using] : Fully functional and needs no help or supervision to perform IADLs (using the telephone, shopping, preparing meals, housekeeping, doing laundry, using transportation, managing medications and managing finances) [Reports changes in hearing] : Reports no changes in hearing [Seat Belt] :  uses seat belt [MammogramDate] : 07/19 [ColonoscopyDate] : 06/17

## 2020-09-16 NOTE — DATA REVIEWED
[FreeTextEntry1] : EKG shows anterior ischemia has been worked up in the past it is artifact copy of EKG given to patient

## 2020-09-17 LAB
ALBUMIN SERPL ELPH-MCNC: 4.7 G/DL
ALP BLD-CCNC: 97 U/L
ALT SERPL-CCNC: 20 U/L
ANION GAP SERPL CALC-SCNC: 13 MMOL/L
APPEARANCE: CLEAR
AST SERPL-CCNC: 22 U/L
BILIRUB SERPL-MCNC: 0.5 MG/DL
BILIRUBIN URINE: NEGATIVE
BLOOD URINE: NEGATIVE
BUN SERPL-MCNC: 17 MG/DL
CALCIUM SERPL-MCNC: 9.6 MG/DL
CHLORIDE SERPL-SCNC: 105 MMOL/L
CHOLEST SERPL-MCNC: 200 MG/DL
CHOLEST/HDLC SERPL: 3.5 RATIO
CO2 SERPL-SCNC: 22 MMOL/L
COLOR: YELLOW
CREAT SERPL-MCNC: 0.86 MG/DL
ESTIMATED AVERAGE GLUCOSE: 114 MG/DL
GLUCOSE QUALITATIVE U: NEGATIVE
GLUCOSE SERPL-MCNC: 105 MG/DL
HBA1C MFR BLD HPLC: 5.6 %
HDLC SERPL-MCNC: 57 MG/DL
KETONES URINE: NEGATIVE
LDLC SERPL CALC-MCNC: 112 MG/DL
LEUKOCYTE ESTERASE URINE: ABNORMAL
NITRITE URINE: NEGATIVE
PH URINE: 6.5
POTASSIUM SERPL-SCNC: 4.4 MMOL/L
PROT SERPL-MCNC: 6.8 G/DL
PROTEIN URINE: NORMAL
SODIUM SERPL-SCNC: 140 MMOL/L
SPECIFIC GRAVITY URINE: 1.02
TRIGL SERPL-MCNC: 156 MG/DL
UROBILINOGEN URINE: NORMAL

## 2021-07-11 NOTE — PROGRESS NOTE ADULT - ALLERGIC/IMMUNOLOGIC
negative
CONSTITUTIONAL: Well-appearing; well-nourished; in no apparent distress.   EYES: PERRL; EOM intact.   CARDIOVASCULAR: Normal S1, S2; no murmurs, rubs, or gallops.   RESPIRATORY: Normal chest excursion with respiration; breath sounds clear and equal bilaterally; no wheezes, rhonchi, or rales.  GI/: Normal bowel sounds; non-distended; non-tender; no palpable organomegaly.   MS: No calf swelling and tenderness.  SKIN: Normal for age and race; warm; dry; good turgor; no apparent lesions or exudate.   NEURO/PSYCH: A & O x 4; grossly unremarkable.

## 2021-07-21 ENCOUNTER — APPOINTMENT (OUTPATIENT)
Dept: FAMILY MEDICINE | Facility: CLINIC | Age: 76
End: 2021-07-21
Payer: MEDICARE

## 2021-07-21 VITALS
BODY MASS INDEX: 31.37 KG/M2 | SYSTOLIC BLOOD PRESSURE: 130 MMHG | HEART RATE: 69 BPM | TEMPERATURE: 97.5 F | OXYGEN SATURATION: 97 % | HEIGHT: 68 IN | DIASTOLIC BLOOD PRESSURE: 84 MMHG | WEIGHT: 207 LBS

## 2021-07-21 DIAGNOSIS — L98.9 DISORDER OF THE SKIN AND SUBCUTANEOUS TISSUE, UNSPECIFIED: ICD-10-CM

## 2021-07-21 PROCEDURE — 99072 ADDL SUPL MATRL&STAF TM PHE: CPT

## 2021-07-21 PROCEDURE — 99213 OFFICE O/P EST LOW 20 MIN: CPT

## 2021-07-21 NOTE — HISTORY OF PRESENT ILLNESS
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 Chiu

## 2021-09-12 ENCOUNTER — RX RENEWAL (OUTPATIENT)
Age: 76
End: 2021-09-12

## 2021-09-23 ENCOUNTER — RX RENEWAL (OUTPATIENT)
Age: 76
End: 2021-09-23

## 2021-11-11 ENCOUNTER — NON-APPOINTMENT (OUTPATIENT)
Age: 76
End: 2021-11-11

## 2021-11-11 ENCOUNTER — APPOINTMENT (OUTPATIENT)
Dept: FAMILY MEDICINE | Facility: CLINIC | Age: 76
End: 2021-11-11
Payer: MEDICARE

## 2021-11-11 VITALS
SYSTOLIC BLOOD PRESSURE: 132 MMHG | HEART RATE: 71 BPM | WEIGHT: 204 LBS | OXYGEN SATURATION: 95 % | TEMPERATURE: 97 F | DIASTOLIC BLOOD PRESSURE: 80 MMHG | BODY MASS INDEX: 30.92 KG/M2 | HEIGHT: 68 IN

## 2021-11-11 DIAGNOSIS — K25.3 ACUTE GASTRIC ULCER W/OUT HEMORRHAGE OR PERFORATION: ICD-10-CM

## 2021-11-11 DIAGNOSIS — R91.8 OTHER NONSPECIFIC ABNORMAL FINDING OF LUNG FIELD: ICD-10-CM

## 2021-11-11 DIAGNOSIS — D09.9 CARCINOMA IN SITU, UNSPECIFIED: ICD-10-CM

## 2021-11-11 PROCEDURE — G0439: CPT

## 2021-11-11 PROCEDURE — 93000 ELECTROCARDIOGRAM COMPLETE: CPT | Mod: 59

## 2021-11-11 PROCEDURE — 36415 COLL VENOUS BLD VENIPUNCTURE: CPT

## 2021-11-11 PROCEDURE — 99497 ADVNCD CARE PLAN 30 MIN: CPT

## 2021-11-12 LAB
25(OH)D3 SERPL-MCNC: 52.4 NG/ML
ALBUMIN SERPL ELPH-MCNC: 4.6 G/DL
ALP BLD-CCNC: 101 U/L
ALT SERPL-CCNC: 28 U/L
ANION GAP SERPL CALC-SCNC: 11 MMOL/L
AST SERPL-CCNC: 22 U/L
BILIRUB SERPL-MCNC: 0.3 MG/DL
BUN SERPL-MCNC: 17 MG/DL
CALCIUM SERPL-MCNC: 9.8 MG/DL
CHLORIDE SERPL-SCNC: 103 MMOL/L
CHOLEST SERPL-MCNC: 188 MG/DL
CO2 SERPL-SCNC: 25 MMOL/L
CREAT SERPL-MCNC: 0.92 MG/DL
ESTIMATED AVERAGE GLUCOSE: 111 MG/DL
GLUCOSE SERPL-MCNC: 95 MG/DL
HBA1C MFR BLD HPLC: 5.5 %
HDLC SERPL-MCNC: 59 MG/DL
LDLC SERPL CALC-MCNC: 108 MG/DL
NONHDLC SERPL-MCNC: 129 MG/DL
POTASSIUM SERPL-SCNC: 4.8 MMOL/L
PROT SERPL-MCNC: 6.4 G/DL
SODIUM SERPL-SCNC: 140 MMOL/L
TRIGL SERPL-MCNC: 104 MG/DL

## 2021-12-06 ENCOUNTER — RX RENEWAL (OUTPATIENT)
Age: 76
End: 2021-12-06

## 2021-12-22 ENCOUNTER — APPOINTMENT (OUTPATIENT)
Dept: CT IMAGING | Facility: HOSPITAL | Age: 76
End: 2021-12-22

## 2022-02-04 NOTE — PATIENT PROFILE ADULT. - MEDICATIONS BROUGHT TO HOSPITAL, PROFILE
Anticipated Discharge Disposition:   SNF, with possible wound vac    Action:   Discussed discharge planning needs during rounds. Per MD, pt is not medically cleared at this time, pending ID recommendations regarding antibiotic. Pt has a wound vac to shoulder. PT/OT recommended SNF. Referrals sent, DPA to follow up.    Barriers to Discharge:   Medical clearance.  Placement acceptance and bed availability.    Plan:   Hospital Care Management will continue to follow and assist with discharge planning needs.         no

## 2022-05-02 NOTE — PHYSICAL EXAM
[Pedal Pulses Present] : the pedal pulses are present [No Edema] : there was no peripheral edema [Normal] : soft, non-tender, non-distended, no masses palpated, no HSM and normal bowel sounds [de-identified] : Healing incisional biopsy lower leg

## 2022-05-02 NOTE — ADDENDUM
[FreeTextEntry1] : Has been no change in patient's condition since her last physical in November.  She now requires emergency surgery for finger fracture.  She did have pulmonary embolus post total knee replacement in 2018.  Her follow-up CAT scan showed stable pulmonary nodules\par \par She is medically stable for emergency surgery on May 3

## 2022-05-02 NOTE — HEALTH RISK ASSESSMENT
[Very Good] : ~his/her~  mood as very good [No] : No [No falls in past year] : Patient reported no falls in the past year [PHQ-2 Negative - No further assessment needed] : PHQ-2 Negative - No further assessment needed [Patient reported mammogram was normal] : Patient reported mammogram was normal [Patient reported colonoscopy was normal] : Patient reported colonoscopy was normal [None] : None [With Family] : lives with family [] :  [Fully functional (bathing, dressing, toileting, transferring, walking, feeding)] : Fully functional (bathing, dressing, toileting, transferring, walking, feeding) [Fully functional (using the telephone, shopping, preparing meals, housekeeping, doing laundry, using] : Fully functional and needs no help or supervision to perform IADLs (using the telephone, shopping, preparing meals, housekeeping, doing laundry, using transportation, managing medications and managing finances) [Seat Belt] :  uses seat belt [With Patient/Caregiver] : , with patient/caregiver [Designated Healthcare Proxy] : Designated healthcare proxy [Relationship: ___] : Relationship: [unfilled] [Aggressive treatment] : aggressive treatment [I will adhere to the patient's wishes.] : I will adhere to the patient's wishes. [Time Spent: ___ minutes] : Time Spent: [unfilled] minutes [] : No [Change in mental status noted] : No change in mental status noted [Reports changes in hearing] : Reports no changes in hearing [Reports changes in vision] : Reports no changes in vision [MammogramDate] : 03/21 [ColonoscopyDate] : 06/17 [AdvancecareDate] : 11/21 [FreeTextEntry4] : 16 minutes spent discussing end-of-life care and options for treatment such as DNR DNI dialysis tube feeding if patient becomes unable to make decisions for himself in a situation where treatment outweighs benefits\par

## 2022-05-02 NOTE — ASSESSMENT
[FreeTextEntry1] : Patient doing very well check labs continue current meds CAT scan of chest ordered

## 2022-05-02 NOTE — HISTORY OF PRESENT ILLNESS
[FreeTextEntry1] : Yearly physical [de-identified] : Patient has no complaints.  No longer has migraines.  Myrbetriq is effective for overactive bladder.  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

## 2022-06-24 ENCOUNTER — RX RENEWAL (OUTPATIENT)
Age: 77
End: 2022-06-24

## 2022-07-30 ENCOUNTER — RX RENEWAL (OUTPATIENT)
Age: 77
End: 2022-07-30

## 2022-09-14 ENCOUNTER — APPOINTMENT (OUTPATIENT)
Dept: FAMILY MEDICINE | Facility: CLINIC | Age: 77
End: 2022-09-14

## 2022-09-14 DIAGNOSIS — U07.1 COVID-19: ICD-10-CM

## 2022-09-14 PROCEDURE — 99442: CPT

## 2022-11-21 ENCOUNTER — NON-APPOINTMENT (OUTPATIENT)
Age: 77
End: 2022-11-21

## 2022-11-21 ENCOUNTER — APPOINTMENT (OUTPATIENT)
Dept: FAMILY MEDICINE | Facility: CLINIC | Age: 77
End: 2022-11-21

## 2022-11-21 VITALS
DIASTOLIC BLOOD PRESSURE: 90 MMHG | WEIGHT: 206 LBS | RESPIRATION RATE: 16 BRPM | HEART RATE: 75 BPM | SYSTOLIC BLOOD PRESSURE: 130 MMHG | BODY MASS INDEX: 31.22 KG/M2 | OXYGEN SATURATION: 99 % | HEIGHT: 68 IN | TEMPERATURE: 97.3 F

## 2022-11-21 DIAGNOSIS — M19.90 UNSPECIFIED OSTEOARTHRITIS, UNSPECIFIED SITE: ICD-10-CM

## 2022-11-21 DIAGNOSIS — R94.31 ABNORMAL ELECTROCARDIOGRAM [ECG] [EKG]: ICD-10-CM

## 2022-11-21 DIAGNOSIS — I27.20 PULMONARY HYPERTENSION, UNSPECIFIED: ICD-10-CM

## 2022-11-21 DIAGNOSIS — N32.81 OVERACTIVE BLADDER: ICD-10-CM

## 2022-11-21 DIAGNOSIS — I26.99 OTHER PULMONARY EMBOLISM W/OUT ACUTE COR PULMONALE: ICD-10-CM

## 2022-11-21 PROCEDURE — 93000 ELECTROCARDIOGRAM COMPLETE: CPT

## 2022-11-21 PROCEDURE — G0439: CPT

## 2022-11-21 RX ORDER — NIRMATRELVIR AND RITONAVIR 300-100 MG
20 X 150 MG & KIT ORAL
Qty: 5 | Refills: 0 | Status: DISCONTINUED | COMMUNITY
Start: 2022-09-14 | End: 2022-11-21

## 2022-11-21 RX ORDER — ESTRADIOL 0.1 MG/G
0.1 CREAM VAGINAL
Qty: 42 | Refills: 0 | Status: ACTIVE | COMMUNITY
Start: 2022-04-28

## 2022-11-21 RX ORDER — CLOBETASOL PROPIONATE 0.5 MG/ML
0.05 SOLUTION TOPICAL
Qty: 50 | Refills: 0 | Status: DISCONTINUED | COMMUNITY
Start: 2022-07-14 | End: 2022-11-21

## 2022-11-21 RX ORDER — CALCIPOTRIENE 0.05 MG/ML
0.01 SOLUTION TOPICAL
Qty: 60 | Refills: 0 | Status: DISCONTINUED | COMMUNITY
Start: 2022-07-15 | End: 2022-11-21

## 2022-11-21 RX ORDER — FLUCONAZOLE 100 MG/1
100 TABLET ORAL
Qty: 24 | Refills: 0 | Status: DISCONTINUED | COMMUNITY
Start: 2022-10-18 | End: 2022-11-21

## 2022-11-21 NOTE — HISTORY OF PRESENT ILLNESS
[FreeTextEntry1] : Yearly physical [de-identified] : Repeat blood pressure 150/90.  Patient will decrease salt in her diet increase exercise and lose weight recheck in 3 to 4 months\par \par Overactive bladder controlled with Myrbetriq\par \par Hyperlipidemia on simvastatin\par \par Gastric ulcer patient uses omeprazole along with her meloxicam for arthritis no GI complaints\par \par Plans on mammogram next month

## 2022-11-21 NOTE — HEALTH RISK ASSESSMENT
[Very Good] : ~his/her~  mood as very good [Never] : Never [No] : No [No falls in past year] : Patient reported no falls in the past year [PHQ-2 Negative - No further assessment needed] : PHQ-2 Negative - No further assessment needed [Patient reported mammogram was normal] : Patient reported mammogram was normal [Patient reported colonoscopy was normal] : Patient reported colonoscopy was normal [Change in mental status noted] : No change in mental status noted [None] : None [] :  [Fully functional (bathing, dressing, toileting, transferring, walking, feeding)] : Fully functional (bathing, dressing, toileting, transferring, walking, feeding) [Fully functional (using the telephone, shopping, preparing meals, housekeeping, doing laundry, using] : Fully functional and needs no help or supervision to perform IADLs (using the telephone, shopping, preparing meals, housekeeping, doing laundry, using transportation, managing medications and managing finances) [Reports changes in hearing] : Reports no changes in hearing [Reports changes in dental health] : Reports no changes in dental health [Seat Belt] :  uses seat belt [MammogramDate] : 06/19 [ColonoscopyDate] : 06/18

## 2022-11-21 NOTE — ASSESSMENT
[FreeTextEntry1] : Patient blood pressure is borderline elevated lifestyle changes discussed recheck 3 to 4 months\par \par Patient has had flu shot and COVID booster

## 2022-11-22 ENCOUNTER — TRANSCRIPTION ENCOUNTER (OUTPATIENT)
Age: 77
End: 2022-11-22

## 2022-11-22 LAB
ALBUMIN SERPL ELPH-MCNC: 4.5 G/DL
ALP BLD-CCNC: 79 U/L
ALT SERPL-CCNC: 17 U/L
ANION GAP SERPL CALC-SCNC: 12 MMOL/L
AST SERPL-CCNC: 21 U/L
BILIRUB SERPL-MCNC: 0.3 MG/DL
BUN SERPL-MCNC: 17 MG/DL
CALCIUM SERPL-MCNC: 9.6 MG/DL
CHLORIDE SERPL-SCNC: 104 MMOL/L
CHOLEST SERPL-MCNC: 211 MG/DL
CO2 SERPL-SCNC: 25 MMOL/L
CREAT SERPL-MCNC: 0.88 MG/DL
EGFR: 68 ML/MIN/1.73M2
ESTIMATED AVERAGE GLUCOSE: 108 MG/DL
GLUCOSE SERPL-MCNC: 104 MG/DL
HBA1C MFR BLD HPLC: 5.4 %
HDLC SERPL-MCNC: 57 MG/DL
LDLC SERPL CALC-MCNC: 126 MG/DL
NONHDLC SERPL-MCNC: 154 MG/DL
POTASSIUM SERPL-SCNC: 4.7 MMOL/L
PROT SERPL-MCNC: 6.5 G/DL
SODIUM SERPL-SCNC: 141 MMOL/L
TRIGL SERPL-MCNC: 138 MG/DL

## 2022-12-01 ENCOUNTER — NON-APPOINTMENT (OUTPATIENT)
Age: 77
End: 2022-12-01

## 2022-12-01 DIAGNOSIS — Z87.898 PERSONAL HISTORY OF OTHER SPECIFIED CONDITIONS: ICD-10-CM

## 2022-12-01 DIAGNOSIS — Z82.69 FAMILY HISTORY OF OTHER DISEASES OF THE MUSCULOSKELETAL SYSTEM AND CONNECTIVE TISSUE: ICD-10-CM

## 2022-12-01 DIAGNOSIS — M25.551 PAIN IN RIGHT HIP: ICD-10-CM

## 2022-12-01 RX ORDER — MULTIVIT-MIN/FA/LYCOPEN/LUTEIN .4-300-25
TABLET ORAL
Refills: 0 | Status: ACTIVE | COMMUNITY

## 2022-12-01 RX ORDER — CETIRIZINE HCL 10 MG/1
10 CAPSULE ORAL
Refills: 0 | Status: ACTIVE | COMMUNITY

## 2022-12-01 RX ORDER — MULTIVIT-MIN/IRON/FOLIC ACID/K 18-600-40
400 CAPSULE ORAL
Refills: 0 | Status: ACTIVE | COMMUNITY

## 2022-12-01 RX ORDER — VITAMIN E (DL,TOCOPHERYL ACET) 180 MG
CAPSULE ORAL
Refills: 0 | Status: ACTIVE | COMMUNITY

## 2023-01-24 ENCOUNTER — RX RENEWAL (OUTPATIENT)
Age: 78
End: 2023-01-24

## 2023-03-20 ENCOUNTER — APPOINTMENT (OUTPATIENT)
Dept: FAMILY MEDICINE | Facility: CLINIC | Age: 78
End: 2023-03-20
Payer: MEDICARE

## 2023-03-20 VITALS
SYSTOLIC BLOOD PRESSURE: 132 MMHG | BODY MASS INDEX: 31.83 KG/M2 | TEMPERATURE: 97.7 F | DIASTOLIC BLOOD PRESSURE: 86 MMHG | WEIGHT: 210 LBS | HEIGHT: 68 IN | HEART RATE: 72 BPM | OXYGEN SATURATION: 97 %

## 2023-03-20 DIAGNOSIS — S81.819A LACERATION W/OUT FOREIGN BODY, UNSPECIFIED LOWER LEG, INITIAL ENCOUNTER: ICD-10-CM

## 2023-03-20 PROCEDURE — 99213 OFFICE O/P EST LOW 20 MIN: CPT

## 2023-03-20 NOTE — HISTORY OF PRESENT ILLNESS
[FreeTextEntry8] : Superficial small triangular shaped skin tear left lower extremity for 2 weeks healing slowly good granulation tissue using Bactrim and Band-Aid.  We will switch to Tegaderm change every 3 days call if fails to improve

## 2023-08-01 ENCOUNTER — RX RENEWAL (OUTPATIENT)
Age: 78
End: 2023-08-01

## 2023-09-19 RX ORDER — OMEPRAZOLE 20 MG/1
20 CAPSULE, DELAYED RELEASE ORAL
Qty: 90 | Refills: 3 | Status: ACTIVE | COMMUNITY
Start: 2020-09-16 | End: 1900-01-01

## 2023-09-21 ENCOUNTER — APPOINTMENT (OUTPATIENT)
Dept: ORTHOPEDIC SURGERY | Facility: CLINIC | Age: 78
End: 2023-09-21
Payer: MEDICARE

## 2023-09-21 VITALS — HEIGHT: 68 IN | BODY MASS INDEX: 31.83 KG/M2 | WEIGHT: 210 LBS

## 2023-09-21 DIAGNOSIS — M70.61 TROCHANTERIC BURSITIS, RIGHT HIP: ICD-10-CM

## 2023-09-21 PROCEDURE — J3490M: CUSTOM

## 2023-09-21 PROCEDURE — 20610 DRAIN/INJ JOINT/BURSA W/O US: CPT | Mod: RT

## 2023-09-21 PROCEDURE — 99213 OFFICE O/P EST LOW 20 MIN: CPT | Mod: 25

## 2023-09-21 PROCEDURE — 73502 X-RAY EXAM HIP UNI 2-3 VIEWS: CPT

## 2023-09-24 PROBLEM — M70.61 TROCHANTERIC BURSITIS OF RIGHT HIP: Status: ACTIVE | Noted: 2022-12-01

## 2023-09-27 ENCOUNTER — APPOINTMENT (OUTPATIENT)
Dept: FAMILY MEDICINE | Facility: CLINIC | Age: 78
End: 2023-09-27
Payer: MEDICARE

## 2023-09-27 ENCOUNTER — LABORATORY RESULT (OUTPATIENT)
Age: 78
End: 2023-09-27

## 2023-09-27 VITALS
HEART RATE: 68 BPM | DIASTOLIC BLOOD PRESSURE: 82 MMHG | WEIGHT: 210 LBS | TEMPERATURE: 97.2 F | SYSTOLIC BLOOD PRESSURE: 126 MMHG | BODY MASS INDEX: 31.83 KG/M2 | HEIGHT: 68 IN | OXYGEN SATURATION: 98 %

## 2023-09-27 DIAGNOSIS — Z00.00 ENCOUNTER FOR GENERAL ADULT MEDICAL EXAMINATION W/OUT ABNORMAL FINDINGS: ICD-10-CM

## 2023-09-27 DIAGNOSIS — E78.5 HYPERLIPIDEMIA, UNSPECIFIED: ICD-10-CM

## 2023-09-27 PROCEDURE — G0439: CPT

## 2023-09-27 RX ORDER — SIMVASTATIN 40 MG/1
40 TABLET, FILM COATED ORAL
Qty: 90 | Refills: 3 | Status: ACTIVE | COMMUNITY
Start: 2019-08-20 | End: 1900-01-01

## 2023-09-28 LAB
25(OH)D3 SERPL-MCNC: 67.2 NG/ML
ALBUMIN SERPL ELPH-MCNC: 4.7 G/DL
ALP BLD-CCNC: 88 U/L
ALT SERPL-CCNC: 21 U/L
ANION GAP SERPL CALC-SCNC: 14 MMOL/L
APPEARANCE: CLEAR
AST SERPL-CCNC: 18 U/L
BASOPHILS # BLD AUTO: 0.06 K/UL
BASOPHILS NFR BLD AUTO: 0.6 %
BILIRUB SERPL-MCNC: 0.3 MG/DL
BILIRUBIN URINE: NEGATIVE
BLOOD URINE: NEGATIVE
BUN SERPL-MCNC: 28 MG/DL
CALCIUM SERPL-MCNC: 10.2 MG/DL
CHLORIDE SERPL-SCNC: 103 MMOL/L
CHOLEST SERPL-MCNC: 215 MG/DL
CO2 SERPL-SCNC: 25 MMOL/L
COLOR: NORMAL
CREAT SERPL-MCNC: 1.03 MG/DL
EGFR: 56 ML/MIN/1.73M2
EOSINOPHIL # BLD AUTO: 0 K/UL
EOSINOPHIL NFR BLD AUTO: 0 %
ESTIMATED AVERAGE GLUCOSE: 117 MG/DL
GLUCOSE QUALITATIVE U: NEGATIVE
GLUCOSE SERPL-MCNC: 97 MG/DL
HBA1C MFR BLD HPLC: 5.7 %
HCT VFR BLD CALC: 40.1 %
HDLC SERPL-MCNC: 68 MG/DL
HGB BLD-MCNC: 12.9 G/DL
IMM GRANULOCYTES NFR BLD AUTO: 0.7 %
KETONES URINE: NEGATIVE
LDLC SERPL CALC-MCNC: 131 MG/DL
LEUKOCYTE ESTERASE URINE: NEGATIVE
LYMPHOCYTES # BLD AUTO: 2.04 K/UL
LYMPHOCYTES NFR BLD AUTO: 19.2 %
MAN DIFF?: NORMAL
MCHC RBC-ENTMCNC: 31.5 PG
MCHC RBC-ENTMCNC: 32.2 GM/DL
MCV RBC AUTO: 97.8 FL
MONOCYTES # BLD AUTO: 0.63 K/UL
MONOCYTES NFR BLD AUTO: 5.9 %
NEUTROPHILS # BLD AUTO: 7.83 K/UL
NEUTROPHILS NFR BLD AUTO: 73.6 %
NITRITE URINE: NEGATIVE
NONHDLC SERPL-MCNC: 147 MG/DL
PH URINE: 6.5
PLATELET # BLD AUTO: 298 K/UL
POTASSIUM SERPL-SCNC: 5.1 MMOL/L
PROT SERPL-MCNC: 6.9 G/DL
PROTEIN URINE: ABNORMAL
RBC # BLD: 4.1 M/UL
RBC # FLD: 13.1 %
SODIUM SERPL-SCNC: 141 MMOL/L
SPECIFIC GRAVITY URINE: 1.02
TRIGL SERPL-MCNC: 91 MG/DL
TSH SERPL-ACNC: 1.16 UIU/ML
UROBILINOGEN URINE: NORMAL
WBC # FLD AUTO: 10.63 K/UL

## 2023-11-08 NOTE — DIETITIAN INITIAL EVALUATION ADULT. - OTHER INFO
----- Message from Lizzette Meadows DNP, FNP-C sent at 11/6/2023  4:42 PM CST -----  Please notify of results.    Triglycerides are a little elevated; needs to avoid fried foods, highly processed foods.     Potassium is a little low; continue to take the potassium 10 meq po twice a day.             72 y/o female s/p total knee replacement found to have b/l PE.Eating adequate amounts.No N/V/D or skin breakdwon(surgical incision)Pain from knee noted.Reported to avoid high salt and fat in diet.Suspect poor compliance with portions due to BMI. .

## 2023-11-16 NOTE — PHYSICAL THERAPY INITIAL EVALUATION ADULT - REHAB POTENTIAL, PT EVAL
Ulices Shah Jr.'s goals for this visit include:   Chief Complaint   Patient presents with    Skin Check     Full body skin check.  No areas of concern.         He requests these members of his care team be copied on today's visit information: n/a    PCP: Akhil Mcmahon    Referring Provider:  No referring provider defined for this encounter.    There were no vitals taken for this visit.    Do you need any medication refills at today's visit? No  Shyla Wetzel RN       good, to achieve stated therapy goals

## 2023-12-08 ENCOUNTER — RX RENEWAL (OUTPATIENT)
Age: 78
End: 2023-12-08

## 2024-06-05 ENCOUNTER — APPOINTMENT (OUTPATIENT)
Dept: FAMILY MEDICINE | Facility: CLINIC | Age: 79
End: 2024-06-05
Payer: MEDICARE

## 2024-06-05 VITALS
TEMPERATURE: 96.9 F | HEART RATE: 70 BPM | SYSTOLIC BLOOD PRESSURE: 118 MMHG | OXYGEN SATURATION: 96 % | HEIGHT: 68 IN | DIASTOLIC BLOOD PRESSURE: 84 MMHG

## 2024-06-05 PROCEDURE — G2211 COMPLEX E/M VISIT ADD ON: CPT

## 2024-06-05 PROCEDURE — 99213 OFFICE O/P EST LOW 20 MIN: CPT

## 2024-06-05 RX ORDER — METHYLPREDNISOLONE 4 MG/1
4 TABLET ORAL
Qty: 1 | Refills: 0 | Status: ACTIVE | COMMUNITY
Start: 2024-06-05 | End: 1900-01-01

## 2024-06-05 NOTE — HISTORY OF PRESENT ILLNESS
[___ Days ago] : [unfilled] days ago [de-identified] : lower back and buttock pain b/l  [FreeTextEntry8] : Pt presenting for lbp and buttock pain unsure of cause no trauma

## 2024-06-21 ENCOUNTER — APPOINTMENT (OUTPATIENT)
Dept: FAMILY MEDICINE | Facility: CLINIC | Age: 79
End: 2024-06-21
Payer: MEDICARE

## 2024-06-21 VITALS
DIASTOLIC BLOOD PRESSURE: 90 MMHG | HEART RATE: 82 BPM | WEIGHT: 210 LBS | SYSTOLIC BLOOD PRESSURE: 142 MMHG | OXYGEN SATURATION: 95 % | TEMPERATURE: 97.6 F | HEIGHT: 68 IN | BODY MASS INDEX: 31.83 KG/M2

## 2024-06-21 DIAGNOSIS — G57.03 LESION OF SCIATIC NERVE, BILATERAL LOWER LIMBS: ICD-10-CM

## 2024-06-21 PROCEDURE — 99213 OFFICE O/P EST LOW 20 MIN: CPT

## 2024-06-21 RX ORDER — TRAMADOL HYDROCHLORIDE 50 MG/1
50 TABLET, COATED ORAL TWICE DAILY
Qty: 20 | Refills: 0 | Status: ACTIVE | COMMUNITY
Start: 2024-06-21 | End: 1900-01-01

## 2024-06-21 NOTE — HISTORY OF PRESENT ILLNESS
[FreeTextEntry8] : 78yo female who presents to the office complaining of back pain.  Patient seen by Dr. Nair on 6/5/24 with similar complaints. Diagnosed with piriformis syndrome. Given steroid. Reports the pain improved with the steroid. She reports continued pain over the coccyx region, which expands across her buttocks. Gets better if once she is moving, but has a lot of pain when she starts moving.  Patient went on a cruise, reports that the pain worsened. She saw the cruise doctor who prescribed tramadol 50mg which helped a lot.  Has orthopedic appointment 7/3/24, requesting refill of Tramadol to help in the meantime.

## 2024-06-21 NOTE — PHYSICAL EXAM
[No Acute Distress] : no acute distress [Coordination Grossly Intact] : coordination grossly intact [Normal Affect] : the affect was normal [Normal Insight/Judgement] : insight and judgment were intact [de-identified] : sacral hypertonicity

## 2024-06-21 NOTE — ASSESSMENT
[FreeTextEntry1] : #Piriformis syndrome - Presenting with continued coccyx pain with radiation to gluteus muscles, highly suggestive of piriformis syndrome - Went on cruise, cruise doctor gave patient Tramadol which help pain greatly - Start Tramadol 50mg BID PRN  - Follow up with ortho appointment

## 2024-06-24 ENCOUNTER — TRANSCRIPTION ENCOUNTER (OUTPATIENT)
Age: 79
End: 2024-06-24

## 2024-06-24 RX ORDER — TRAMADOL HYDROCHLORIDE 50 MG/1
50 TABLET, COATED ORAL TWICE DAILY
Qty: 40 | Refills: 0 | Status: ACTIVE | COMMUNITY
Start: 2024-06-24 | End: 1900-01-01

## 2024-06-25 ENCOUNTER — TRANSCRIPTION ENCOUNTER (OUTPATIENT)
Age: 79
End: 2024-06-25

## 2024-07-02 ENCOUNTER — APPOINTMENT (OUTPATIENT)
Dept: ORTHOPEDIC SURGERY | Facility: CLINIC | Age: 79
End: 2024-07-02
Payer: MEDICARE

## 2024-07-02 VITALS — WEIGHT: 210 LBS | BODY MASS INDEX: 31.83 KG/M2 | HEIGHT: 68 IN

## 2024-07-02 DIAGNOSIS — M43.16 SPONDYLOLISTHESIS, LUMBAR REGION: ICD-10-CM

## 2024-07-02 DIAGNOSIS — M54.50 LOW BACK PAIN, UNSPECIFIED: ICD-10-CM

## 2024-07-02 PROCEDURE — 72100 X-RAY EXAM L-S SPINE 2/3 VWS: CPT

## 2024-07-02 PROCEDURE — 99204 OFFICE O/P NEW MOD 45 MIN: CPT

## 2024-07-02 RX ORDER — METHYLPREDNISOLONE 4 MG/1
4 TABLET ORAL
Qty: 1 | Refills: 0 | Status: ACTIVE | COMMUNITY
Start: 2024-07-02 | End: 1900-01-01

## 2024-07-02 RX ORDER — CYCLOBENZAPRINE HYDROCHLORIDE 5 MG/1
5 TABLET, FILM COATED ORAL 3 TIMES DAILY
Qty: 60 | Refills: 2 | Status: ACTIVE | COMMUNITY
Start: 2024-07-02 | End: 1900-01-01

## 2024-07-03 ENCOUNTER — APPOINTMENT (OUTPATIENT)
Dept: ORTHOPEDIC SURGERY | Facility: CLINIC | Age: 79
End: 2024-07-03

## 2024-07-11 ENCOUNTER — APPOINTMENT (OUTPATIENT)
Dept: MRI IMAGING | Facility: CLINIC | Age: 79
End: 2024-07-11
Payer: MEDICARE

## 2024-07-11 PROCEDURE — 72148 MRI LUMBAR SPINE W/O DYE: CPT

## 2024-07-16 ENCOUNTER — APPOINTMENT (OUTPATIENT)
Dept: ORTHOPEDIC SURGERY | Facility: CLINIC | Age: 79
End: 2024-07-16
Payer: MEDICARE

## 2024-07-16 DIAGNOSIS — M48.061 SPINAL STENOSIS, LUMBAR REGION WITHOUT NEUROGENIC CLAUDICATION: ICD-10-CM

## 2024-07-16 PROCEDURE — 99213 OFFICE O/P EST LOW 20 MIN: CPT

## 2024-08-01 ENCOUNTER — APPOINTMENT (OUTPATIENT)
Dept: PAIN MANAGEMENT | Facility: CLINIC | Age: 79
End: 2024-08-01
Payer: MEDICARE

## 2024-08-01 VITALS — HEIGHT: 68 IN | BODY MASS INDEX: 32.58 KG/M2 | WEIGHT: 215 LBS

## 2024-08-01 DIAGNOSIS — M48.061 SPINAL STENOSIS, LUMBAR REGION WITHOUT NEUROGENIC CLAUDICATION: ICD-10-CM

## 2024-08-01 DIAGNOSIS — M47.816 SPONDYLOSIS W/OUT MYELOPATHY OR RADICULOPATHY, LUMBAR REGION: ICD-10-CM

## 2024-08-01 DIAGNOSIS — M43.16 SPONDYLOLISTHESIS, LUMBAR REGION: ICD-10-CM

## 2024-08-01 DIAGNOSIS — M25.551 PAIN IN RIGHT HIP: ICD-10-CM

## 2024-08-01 DIAGNOSIS — M48.062 SPINAL STENOSIS, LUMBAR REGION WITH NEUROGENIC CLAUDICATION: ICD-10-CM

## 2024-08-01 DIAGNOSIS — G57.03 LESION OF SCIATIC NERVE, BILATERAL LOWER LIMBS: ICD-10-CM

## 2024-08-01 DIAGNOSIS — Z87.39 PERSONAL HISTORY OF OTHER DISEASES OF THE MUSCULOSKELETAL SYSTEM AND CONNECTIVE TISSUE: ICD-10-CM

## 2024-08-01 DIAGNOSIS — M25.512 PAIN IN LEFT SHOULDER: ICD-10-CM

## 2024-08-01 PROCEDURE — 99204 OFFICE O/P NEW MOD 45 MIN: CPT

## 2024-08-01 NOTE — HISTORY OF PRESENT ILLNESS
[Lower back] : lower back [Sudden] : sudden [5] : 5 [Dull/Aching] : dull/aching [FreeTextEntry1] : The patient presents for initial evaluation regarding their low back pain. Patient was referred by Dr. Bush. Patient has been having pain since  without any inciting event.  Her pain is across the lower back with radiation bilaterally to the buttocks, prolonged standing and walking will exacerbate her pain and she has no pain while seated. Patient has taken a MDP which provided short term relief from her pain, and has just started formal PT.   Subjective weakness: No  Lower extremity paresthesias: No  Bladder/bowel dysfunction: No   Injections: No   Pertinent Surgical History: N/A   Imagin) MRI Lumbar Spine (2024) - [facility]  Physician Disclaimer: I have personally reviewed and confirmed all HPI data with the patient.  [] : Patient is currently injured and not playing sports: no [de-identified] : lumbar mri at [facility]

## 2024-08-01 NOTE — ASSESSMENT
[FreeTextEntry1] : A discussion regarding available pain management treatment options occurred with the patient.  These included interventional, rehabilitative, pharmacological, and alternative modalities. We will proceed with the following:    Interventional treatment options:   -Proceed with bilateral L4-L5 TFESI with fluoroscopic guidance -Can consider lumbar facet direct intervention for ongoing axial low back pain; not discussed - see additional instructions below    Rehabilitative options:   - continue physical therapy   - participation in active HEP was discussed and encouraged as tolerated  Medication based treatment options:   - Continue meloxicam 7.5-15 mg daily as needed - continue tramadol 50 mg up to BID as needed for severe pain - see additional instructions below    Complementary treatment options:   - Weight management and lifestyle modifications discussed   Additional treatment recommendations as follows:   - patient will follow-up with Dr. Bush as directed - Follow up 1-2 weeks post injection for assessment of efficacy and further treatment recommendations  The risks, benefits and alternatives of the proposed procedure were explained in detail with the patient. The risks outlined include but are not limited to infection, bleeding, post- dural puncture headache, nerve injury, a temporary increase in pain, failure to resolve symptoms, need for future interventions, allergic reaction, and possible elevation of blood sugar in diabetics if using corticosteroid.  All questions were answered to patient's apparent satisfaction, and he/she verbalized an understanding.  We have discussed the risks, benefits, and alternatives for NSAID therapy including but not limited to the risk of bleeding, thrombosis, gastric mucosal irritation/ulceration, allergic reaction and kidney dysfunction.  The patient verbalizes an understanding.  The documentation recorded by the scribe, in my presence, accurately reflects the service I personally performed and the decisions made by me with my edits as appropriate.   I, Lele Puentes acting as scribe, attest that this documentation has been prepared under the direction and in the presence of Provider Chong Yao DO.

## 2024-08-01 NOTE — REASON FOR VISIT
[Initial Consultation] : an initial pain management consultation [FreeTextEntry2] : Low back/bilateral buttock pain

## 2024-08-01 NOTE — PHYSICAL EXAM
[de-identified] : Constitutional:   - No acute distress   - Well developed; well nourished    Neurological:   - normal mood and affect   - alert and oriented x 3     Cardiovascular:   - grossly normal   Lumbar Spine Exam:   Inspection: erythema (-) ecchymosis (-) rashes (-) alignment: no scoliosis   Palpation: Midline lumbar tenderness:            (-) midline thoracic tenderness:          (-) Lumbar paraspinal tenderness:  L (-) ; R (-) thoracic paraspinal tenderness: L (-) ; R (-) sciatic nerve tenderness :          L (-) ; R (-) SI joint tenderness:                     L (-) ; R (-) GTB tenderness:                        L (-);  R (-)   ROM: WNL pain with extension   Strength:                                    Right       Left    Hip Flexion:                (4/5)       (4/5) Quadriceps:               (5/5)       (5/5) Hamstrings:                (5/5)       (5/5) Ankle Dorsiflexion:     (5/5)       (5/5) EHL:                           (5/5)       (5/5) Ankle Plantarflexion:  (5/5)       (5/5)   Special Tests: SLR:                            R (-) ; L (-) Facet loading:             R (-) ; L (-) BJORN test:                R (N/A) ; L (N/A) Hamstring tightness:   R (-);  L (-)   Neurologic: SILT throughout right lower extremity SILT throughout left lower extremity   Reflexes normal and symmetric bilateral lower extremities   Gait: Mildly antalgic gait ambulates without assistive device

## 2024-08-07 ENCOUNTER — APPOINTMENT (OUTPATIENT)
Dept: PAIN MANAGEMENT | Facility: CLINIC | Age: 79
End: 2024-08-07

## 2024-08-07 PROBLEM — M54.16 LUMBAR RADICULOPATHY: Status: ACTIVE | Noted: 2024-08-07

## 2024-08-07 PROCEDURE — 64483 NJX AA&/STRD TFRM EPI L/S 1: CPT | Mod: LT

## 2024-08-07 NOTE — PROCEDURE
[FreeTextEntry3] : Date of Service: 08/07/2024   Account: [id number]  Patient: ANNEL CLEMENS   YOB: 1945  Age: 79 year  Surgeon:      Chong Yao DO  Assistant:    None  Pre-Operative Diagnosis:         Lumbar radiculopathy                  Post Operative Diagnosis:       Lumbar Radiculopathy                 Procedure:             Bilateral L4-L5 transforaminal epidural steroid injection under fluoroscopic guidance.  Anesthesia: MAC  This procedure was carried out using fluoroscopic guidance.  The risks and benefits of the procedure were discussed extensively with the patient.  The consent of the patient was obtained and the following procedure was performed.  A timeout was performed with all essential staff present and the site and side were verified.  The right L4-L5 neural foramen was then identified on right oblique "nehemias dog" anatomical view at the 6 o' clock position using fluoroscopic guidance, and the area was marked. The overlying skin and subcutaneous structures were anesthetized using sterile technique with 1% Lidocaine.   A 22-gauge 5-inch spinal needle was directed toward the inferior (6 o'clock) position of the pedicle, which formed the roof of the identified foramen.  Once in the epidural space, after negative aspiration for heme and CSF, 1cc of Omnipaque contrast was injected to confirm epidural location and assess filling defects and rule out intravascular needle placement.   Lumbar epidurogram showed no intravascular or intrathecal flow pattern.  No blood or CSF was aspirated.  Omnipaque spread medially in epidural space and outlined the exiting nerve root.  After this, 2.5 cc of a mixture of 4 mL of preservative free normal saline plus 40 mg of Kenalog was injected in the epidural space.  Then attention was focused to the left L4-L5 neural foramen.  This was then identified on left oblique "nehemias dog" anatomical view at the 6 o' clock position using fluoroscopic guidance, and the area was marked.  The overlying skin and subcutaneous structures were anesthetized using sterile technique with 1% Lidocaine.  A 22-gauge 5-inch spinal needle was directed toward the inferior (6 o'clock) position of the pedicle, which formed the roof of the identified foramen.  Once in the epidural space, after negative aspiration for heme and CSF, 1cc of Omnipaque contrast was injected to confirm epidural location and assess filling defects and rule out intravascular needle placement.   The following contrast flow observed: no intravascular or intrathecal flow pattern was noted.  No blood or CSF was aspirated. Omnipaque spread medially in epidural space.    After this, the remainder of the injectate listed above was injected in the epidural space.  Vital signs remained normal throughout the procedure.  The patient tolerated the procedure well.  There were no immediate complications from the performed procedure.  The patient was instructed to apply ice over the injection sites for twenty minutes every two hours for the next 24 hours.  Disposition:      1. The patient was advised to F/U in 1-2 weeks to assess the response to the injection.      2. The patient was also instructed to contact me immediately if there were any concerns related to the procedure performed.

## 2024-09-03 ENCOUNTER — APPOINTMENT (OUTPATIENT)
Dept: ORTHOPEDIC SURGERY | Facility: CLINIC | Age: 79
End: 2024-09-03
Payer: MEDICARE

## 2024-09-03 PROCEDURE — 99213 OFFICE O/P EST LOW 20 MIN: CPT

## 2024-09-03 NOTE — HISTORY OF PRESENT ILLNESS
[de-identified] : 7/2/24  79F with BP and BLLE radic into buttocks. began 1 month ago.  had MDP and then went on cruise from [place].  Pain improve don MDP but recurred on cruise .Pain radiates into bilateral buttocks. No pervious back pain or radic.  Never done PT for back.  has been taking meloxicam daily and intermittent advil.  Also taking tramadol from [place] on the cruise.  No bowel or bladder symptoms No Fevers or chills.    07/16/24: Patient here for MRI results for the lower back.   9/3/24  Pain improved with EDDY.

## 2024-09-03 NOTE — ASSESSMENT
[FreeTextEntry1] : 79F with Low back pain lumbar spondylolisthesis and BLLe radic Pain improved with EDDY.  c/w HEP NSAIDS prn.  Fu PNR

## 2024-09-03 NOTE — IMAGING
[de-identified] : Spine: Inspection/Palpation: No tenderness to palpation throughout Cervical/thoracic/lumbar spine. No bony stepoffs, No lesions.  Gait: antalgic, able to perform bilateral toe and heel rise.     Range of Motion: Lumbar Spine: Flexion to 90 degrees, Extension to 30 degreespain worse with extension Neurologic  Bilateral Lower Extremities 5/5 Iliopsoas/Quadriceps/Hamstrings/ Tibialis Anterior/ Gastrocnemius. Extensor Hallucis Longus/ Flexor Hallucis Longus except    Sensation intact to light touch L2-S1    X-ray Ap/Lateral; of lumbar spine were viewed and interpreted.  L4-5 spondylolisthesis. L5-s1 disc height loss   MRI Lumbar spine reviewed and interpreted independently.  Agree with report as follows.  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 worst From L3-5

## 2024-09-05 ENCOUNTER — APPOINTMENT (OUTPATIENT)
Dept: PAIN MANAGEMENT | Facility: CLINIC | Age: 79
End: 2024-09-05
Payer: MEDICARE

## 2024-09-05 VITALS — HEIGHT: 68 IN | WEIGHT: 217 LBS | BODY MASS INDEX: 32.89 KG/M2

## 2024-09-05 DIAGNOSIS — M54.16 RADICULOPATHY, LUMBAR REGION: ICD-10-CM

## 2024-09-05 DIAGNOSIS — M43.16 SPONDYLOLISTHESIS, LUMBAR REGION: ICD-10-CM

## 2024-09-05 DIAGNOSIS — M48.062 SPINAL STENOSIS, LUMBAR REGION WITH NEUROGENIC CLAUDICATION: ICD-10-CM

## 2024-09-05 DIAGNOSIS — M47.816 SPONDYLOSIS W/OUT MYELOPATHY OR RADICULOPATHY, LUMBAR REGION: ICD-10-CM

## 2024-09-05 PROCEDURE — 99213 OFFICE O/P EST LOW 20 MIN: CPT

## 2024-09-05 NOTE — HISTORY OF PRESENT ILLNESS
[Lower back] : lower back [Sudden] : sudden [5] : 5 [Dull/Aching] : dull/aching [0] : 0 [Injection therapy] : injection therapy [FreeTextEntry1] : 2024 - Patient presents for FUV after a bilateral L4-L5 TFESI on 24. She reports 100% relief of her symptoms.  Has increased her walking tolerance and has been quite active since the injection.  She is very pleased with her response to the procedure.  2024 - The patient presents for initial evaluation regarding their low back pain. Patient was referred by Dr. Bush. Patient has been having pain since  without any inciting event.  Her pain is across the lower back with radiation bilaterally to the buttocks, prolonged standing and walking will exacerbate her pain and she has no pain while seated. Patient has taken a MDP which provided short term relief from her pain, and has just started formal PT.   Injections:  1) Biliteral L4-5 TFESI (24)  Pertinent Surgical History: N/A   Imagin) MRI Lumbar Spine (2024) - [facility]  Physician Disclaimer: I have personally reviewed and confirmed all HPI data with the patient.  [] : Patient is currently injured and not playing sports: no [de-identified] : lumbar mri at [facility]

## 2024-09-05 NOTE — PHYSICAL EXAM
[de-identified] : Constitutional:   - No acute distress   - Well developed; well nourished    Neurological:   - normal mood and affect   - alert and oriented x 3     Cardiovascular:   - grossly normal   Lumbar Spine Exam:   Inspection: erythema (-) ecchymosis (-) rashes (-) alignment: no scoliosis   Palpation: Midline lumbar tenderness:            (-) midline thoracic tenderness:          (-) Lumbar paraspinal tenderness:  L (-) ; R (-) thoracic paraspinal tenderness: L (-) ; R (-) sciatic nerve tenderness :          L (-) ; R (-) SI joint tenderness:                     L (-) ; R (-) GTB tenderness:                        L (-);  R (-)   ROM: WNL pain with extension   Strength:                                    Right       Left    Hip Flexion:                (5/5)       (5/5) Quadriceps:               (5/5)       (5/5) Hamstrings:                (5/5)       (5/5) Ankle Dorsiflexion:     (5/5)       (5/5) EHL:                           (5/5)       (5/5) Ankle Plantarflexion:  (5/5)       (5/5)   Special Tests: SLR:                            R (-) ; L (-) Facet loading:             R (-) ; L (-) BJORN test:                R (N/A) ; L (N/A) Hamstring tightness:   R (-);  L (-)   Neurologic: SILT throughout right lower extremity SILT throughout left lower extremity   Reflexes normal and symmetric bilateral lower extremities   Gait: Mildly antalgic gait ambulates without assistive device

## 2024-09-05 NOTE — HISTORY OF PRESENT ILLNESS
[Lower back] : lower back [Sudden] : sudden [5] : 5 [Dull/Aching] : dull/aching [0] : 0 [Injection therapy] : injection therapy [FreeTextEntry1] : 2024 - Patient presents for FUV after a bilateral L4-L5 TFESI on 24. She reports 100% relief of her symptoms.  Has increased her walking tolerance and has been quite active since the injection.  She is very pleased with her response to the procedure.  2024 - The patient presents for initial evaluation regarding their low back pain. Patient was referred by Dr. Bush. Patient has been having pain since  without any inciting event.  Her pain is across the lower back with radiation bilaterally to the buttocks, prolonged standing and walking will exacerbate her pain and she has no pain while seated. Patient has taken a MDP which provided short term relief from her pain, and has just started formal PT.   Injections:  1) Biliteral L4-5 TFESI (24)  Pertinent Surgical History: N/A   Imagin) MRI Lumbar Spine (2024) - [facility]  Physician Disclaimer: I have personally reviewed and confirmed all HPI data with the patient.  [] : Patient is currently injured and not playing sports: no [de-identified] : lumbar mri at [facility]

## 2024-09-05 NOTE — REASON FOR VISIT
[Follow-Up Visit] : a follow-up pain management visit [FreeTextEntry2] : Low back/bilateral buttock pain

## 2024-09-05 NOTE — PHYSICAL EXAM
[de-identified] : Constitutional:   - No acute distress   - Well developed; well nourished    Neurological:   - normal mood and affect   - alert and oriented x 3     Cardiovascular:   - grossly normal   Lumbar Spine Exam:   Inspection: erythema (-) ecchymosis (-) rashes (-) alignment: no scoliosis   Palpation: Midline lumbar tenderness:            (-) midline thoracic tenderness:          (-) Lumbar paraspinal tenderness:  L (-) ; R (-) thoracic paraspinal tenderness: L (-) ; R (-) sciatic nerve tenderness :          L (-) ; R (-) SI joint tenderness:                     L (-) ; R (-) GTB tenderness:                        L (-);  R (-)   ROM: WNL pain with extension   Strength:                                    Right       Left    Hip Flexion:                (5/5)       (5/5) Quadriceps:               (5/5)       (5/5) Hamstrings:                (5/5)       (5/5) Ankle Dorsiflexion:     (5/5)       (5/5) EHL:                           (5/5)       (5/5) Ankle Plantarflexion:  (5/5)       (5/5)   Special Tests: SLR:                            R (-) ; L (-) Facet loading:             R (-) ; L (-) BJORN test:                R (N/A) ; L (N/A) Hamstring tightness:   R (-);  L (-)   Neurologic: SILT throughout right lower extremity SILT throughout left lower extremity   Reflexes normal and symmetric bilateral lower extremities   Gait: Mildly antalgic gait ambulates without assistive device

## 2024-09-05 NOTE — PHYSICAL EXAM
[de-identified] : Constitutional:   - No acute distress   - Well developed; well nourished    Neurological:   - normal mood and affect   - alert and oriented x 3     Cardiovascular:   - grossly normal   Lumbar Spine Exam:   Inspection: erythema (-) ecchymosis (-) rashes (-) alignment: no scoliosis   Palpation: Midline lumbar tenderness:            (-) midline thoracic tenderness:          (-) Lumbar paraspinal tenderness:  L (-) ; R (-) thoracic paraspinal tenderness: L (-) ; R (-) sciatic nerve tenderness :          L (-) ; R (-) SI joint tenderness:                     L (-) ; R (-) GTB tenderness:                        L (-);  R (-)   ROM: WNL pain with extension   Strength:                                    Right       Left    Hip Flexion:                (5/5)       (5/5) Quadriceps:               (5/5)       (5/5) Hamstrings:                (5/5)       (5/5) Ankle Dorsiflexion:     (5/5)       (5/5) EHL:                           (5/5)       (5/5) Ankle Plantarflexion:  (5/5)       (5/5)   Special Tests: SLR:                            R (-) ; L (-) Facet loading:             R (-) ; L (-) BJORN test:                R (N/A) ; L (N/A) Hamstring tightness:   R (-);  L (-)   Neurologic: SILT throughout right lower extremity SILT throughout left lower extremity   Reflexes normal and symmetric bilateral lower extremities   Gait: Mildly antalgic gait ambulates without assistive device

## 2024-09-05 NOTE — HISTORY OF PRESENT ILLNESS
[Lower back] : lower back [Sudden] : sudden [5] : 5 [Dull/Aching] : dull/aching [0] : 0 [Injection therapy] : injection therapy [FreeTextEntry1] : 2024 - Patient presents for FUV after a bilateral L4-L5 TFESI on 24. She reports 100% relief of her symptoms.  Has increased her walking tolerance and has been quite active since the injection.  She is very pleased with her response to the procedure.  2024 - The patient presents for initial evaluation regarding their low back pain. Patient was referred by Dr. Bush. Patient has been having pain since  without any inciting event.  Her pain is across the lower back with radiation bilaterally to the buttocks, prolonged standing and walking will exacerbate her pain and she has no pain while seated. Patient has taken a MDP which provided short term relief from her pain, and has just started formal PT.   Injections:  1) Biliteral L4-5 TFESI (24)  Pertinent Surgical History: N/A   Imagin) MRI Lumbar Spine (2024) - [facility]  Physician Disclaimer: I have personally reviewed and confirmed all HPI data with the patient.  [] : Patient is currently injured and not playing sports: no [de-identified] : lumbar mri at [facility]

## 2024-09-05 NOTE — ASSESSMENT
[FreeTextEntry1] : A discussion regarding available pain management treatment options occurred with the patient.  These included interventional, rehabilitative, pharmacological, and alternative modalities. We will proceed with the following:    Interventional treatment options:  - None indicated present time - Can consider repeat bilateral L4-L5 TFESI with return of severe radicular/claudication pain - Can consider lumbar facet direct intervention for ongoing axial low back pain; not discussed - see additional instructions below    Rehabilitative options:   - Completed recent physical therapy trial - participation in active HEP was discussed and encouraged as tolerated  Medication based treatment options:   - Continue meloxicam 7.5-15 mg daily as needed - continue tramadol 50 mg up to BID as needed for severe pain - Patient has largely discontinued medication use following interventional therapy - see additional instructions below    Complementary treatment options:   - Weight management and lifestyle modifications discussed   Additional treatment recommendations as follows:   - patient will follow-up with Dr. Bush as directed - Follow-up in 3 months or as needed basis  We have discussed the risks, benefits, and alternatives for NSAID therapy including but not limited to the risk of bleeding, thrombosis, gastric mucosal irritation/ulceration, allergic reaction and kidney dysfunction.  The patient verbalizes an understanding.

## 2024-10-30 ENCOUNTER — APPOINTMENT (OUTPATIENT)
Dept: FAMILY MEDICINE | Facility: CLINIC | Age: 79
End: 2024-10-30
Payer: MEDICARE

## 2024-10-30 ENCOUNTER — NON-APPOINTMENT (OUTPATIENT)
Age: 79
End: 2024-10-30

## 2024-10-30 VITALS
TEMPERATURE: 97.8 F | OXYGEN SATURATION: 98 % | HEART RATE: 68 BPM | BODY MASS INDEX: 31.07 KG/M2 | RESPIRATION RATE: 16 BRPM | HEIGHT: 68 IN | DIASTOLIC BLOOD PRESSURE: 80 MMHG | SYSTOLIC BLOOD PRESSURE: 144 MMHG | WEIGHT: 205 LBS

## 2024-10-30 VITALS — SYSTOLIC BLOOD PRESSURE: 122 MMHG | DIASTOLIC BLOOD PRESSURE: 80 MMHG

## 2024-10-30 DIAGNOSIS — Z13.29 ENCOUNTER FOR SCREENING FOR OTHER SUSPECTED ENDOCRINE DISORDER: ICD-10-CM

## 2024-10-30 DIAGNOSIS — Z00.00 ENCOUNTER FOR GENERAL ADULT MEDICAL EXAMINATION W/OUT ABNORMAL FINDINGS: ICD-10-CM

## 2024-10-30 DIAGNOSIS — Z11.59 ENCOUNTER FOR SCREENING FOR OTHER VIRAL DISEASES: ICD-10-CM

## 2024-10-30 DIAGNOSIS — Z13.228 ENCOUNTER FOR SCREENING FOR OTHER SUSPECTED ENDOCRINE DISORDER: ICD-10-CM

## 2024-10-30 DIAGNOSIS — Z13.0 ENCOUNTER FOR SCREENING FOR OTHER SUSPECTED ENDOCRINE DISORDER: ICD-10-CM

## 2024-10-30 DIAGNOSIS — M85.80 OTHER SPECIFIED DISORDERS OF BONE DENSITY AND STRUCTURE, UNSPECIFIED SITE: ICD-10-CM

## 2024-10-30 DIAGNOSIS — E78.5 HYPERLIPIDEMIA, UNSPECIFIED: ICD-10-CM

## 2024-10-30 PROCEDURE — 93000 ELECTROCARDIOGRAM COMPLETE: CPT

## 2024-10-30 PROCEDURE — G0439: CPT

## 2024-11-01 ENCOUNTER — TRANSCRIPTION ENCOUNTER (OUTPATIENT)
Age: 79
End: 2024-11-01

## 2024-11-01 LAB
ALBUMIN SERPL ELPH-MCNC: 4.4 G/DL
ALP BLD-CCNC: 91 U/L
ALT SERPL-CCNC: 18 U/L
ANION GAP SERPL CALC-SCNC: 13 MMOL/L
APPEARANCE: CLEAR
AST SERPL-CCNC: 19 U/L
BASOPHILS # BLD AUTO: 0.05 K/UL
BASOPHILS NFR BLD AUTO: 0.6 %
BILIRUB SERPL-MCNC: 0.3 MG/DL
BILIRUBIN URINE: NEGATIVE
BLOOD URINE: NEGATIVE
BUN SERPL-MCNC: 31 MG/DL
CALCIUM SERPL-MCNC: 10.3 MG/DL
CHLORIDE SERPL-SCNC: 103 MMOL/L
CHOLEST SERPL-MCNC: 187 MG/DL
CO2 SERPL-SCNC: 24 MMOL/L
COLOR: NORMAL
CREAT SERPL-MCNC: 1.1 MG/DL
EGFR: 52 ML/MIN/1.73M2
EOSINOPHIL # BLD AUTO: 0 K/UL
EOSINOPHIL NFR BLD AUTO: 0 %
ESTIMATED AVERAGE GLUCOSE: 108 MG/DL
GLUCOSE QUALITATIVE U: NEGATIVE MG/DL
GLUCOSE SERPL-MCNC: 99 MG/DL
HBA1C MFR BLD HPLC: 5.4 %
HCT VFR BLD CALC: 38 %
HCV AB SER QL: NONREACTIVE
HCV S/CO RATIO: 0.08 S/CO
HDLC SERPL-MCNC: 62 MG/DL
HGB BLD-MCNC: 12.3 G/DL
IMM GRANULOCYTES NFR BLD AUTO: 0.4 %
KETONES URINE: NEGATIVE MG/DL
LDLC SERPL CALC-MCNC: 110 MG/DL
LEUKOCYTE ESTERASE URINE: NEGATIVE
LYMPHOCYTES # BLD AUTO: 1.73 K/UL
LYMPHOCYTES NFR BLD AUTO: 21.3 %
MAN DIFF?: NORMAL
MCHC RBC-ENTMCNC: 31.7 PG
MCHC RBC-ENTMCNC: 32.4 G/DL
MCV RBC AUTO: 97.9 FL
MONOCYTES # BLD AUTO: 0.58 K/UL
MONOCYTES NFR BLD AUTO: 7.2 %
NEUTROPHILS # BLD AUTO: 5.72 K/UL
NEUTROPHILS NFR BLD AUTO: 70.5 %
NITRITE URINE: NEGATIVE
NONHDLC SERPL-MCNC: 124 MG/DL
PH URINE: 5.5
PLATELET # BLD AUTO: 269 K/UL
POTASSIUM SERPL-SCNC: 4.8 MMOL/L
PROT SERPL-MCNC: 6.6 G/DL
PROTEIN URINE: NORMAL MG/DL
RBC # BLD: 3.88 M/UL
RBC # FLD: 12.9 %
SODIUM SERPL-SCNC: 140 MMOL/L
SPECIFIC GRAVITY URINE: >1.03
TRIGL SERPL-MCNC: 75 MG/DL
TSH SERPL-ACNC: 1.05 UIU/ML
UROBILINOGEN URINE: 0.2 MG/DL
WBC # FLD AUTO: 8.11 K/UL

## 2024-11-05 ENCOUNTER — APPOINTMENT (OUTPATIENT)
Dept: PAIN MANAGEMENT | Facility: CLINIC | Age: 79
End: 2024-11-05
Payer: MEDICARE

## 2024-11-05 VITALS — WEIGHT: 208 LBS | BODY MASS INDEX: 31.52 KG/M2 | HEIGHT: 68 IN

## 2024-11-05 DIAGNOSIS — M47.816 SPONDYLOSIS W/OUT MYELOPATHY OR RADICULOPATHY, LUMBAR REGION: ICD-10-CM

## 2024-11-05 DIAGNOSIS — M48.062 SPINAL STENOSIS, LUMBAR REGION WITH NEUROGENIC CLAUDICATION: ICD-10-CM

## 2024-11-05 DIAGNOSIS — M43.16 SPONDYLOLISTHESIS, LUMBAR REGION: ICD-10-CM

## 2024-11-05 PROCEDURE — 99214 OFFICE O/P EST MOD 30 MIN: CPT

## 2024-11-15 ENCOUNTER — APPOINTMENT (OUTPATIENT)
Dept: PAIN MANAGEMENT | Facility: CLINIC | Age: 79
End: 2024-11-15
Payer: MEDICARE

## 2024-11-15 DIAGNOSIS — M54.16 RADICULOPATHY, LUMBAR REGION: ICD-10-CM

## 2024-11-15 PROCEDURE — 62323 NJX INTERLAMINAR LMBR/SAC: CPT

## 2024-12-05 ENCOUNTER — APPOINTMENT (OUTPATIENT)
Dept: PAIN MANAGEMENT | Facility: CLINIC | Age: 79
End: 2024-12-05
Payer: MEDICARE

## 2024-12-05 VITALS — WEIGHT: 208 LBS | HEIGHT: 68 IN | BODY MASS INDEX: 31.52 KG/M2

## 2024-12-05 DIAGNOSIS — M54.16 RADICULOPATHY, LUMBAR REGION: ICD-10-CM

## 2024-12-05 DIAGNOSIS — M48.062 SPINAL STENOSIS, LUMBAR REGION WITH NEUROGENIC CLAUDICATION: ICD-10-CM

## 2024-12-05 DIAGNOSIS — M47.816 SPONDYLOSIS W/OUT MYELOPATHY OR RADICULOPATHY, LUMBAR REGION: ICD-10-CM

## 2024-12-05 DIAGNOSIS — M43.16 SPONDYLOLISTHESIS, LUMBAR REGION: ICD-10-CM

## 2024-12-05 PROCEDURE — 99214 OFFICE O/P EST MOD 30 MIN: CPT

## 2024-12-10 ENCOUNTER — APPOINTMENT (OUTPATIENT)
Dept: FAMILY MEDICINE | Facility: CLINIC | Age: 79
End: 2024-12-10
Payer: MEDICARE

## 2024-12-10 VITALS
HEART RATE: 71 BPM | WEIGHT: 205 LBS | TEMPERATURE: 97.3 F | HEIGHT: 68 IN | SYSTOLIC BLOOD PRESSURE: 124 MMHG | BODY MASS INDEX: 31.07 KG/M2 | OXYGEN SATURATION: 98 % | DIASTOLIC BLOOD PRESSURE: 80 MMHG

## 2024-12-10 DIAGNOSIS — Z01.818 ENCOUNTER FOR OTHER PREPROCEDURAL EXAMINATION: ICD-10-CM

## 2024-12-10 DIAGNOSIS — H26.9 UNSPECIFIED CATARACT: ICD-10-CM

## 2024-12-10 PROCEDURE — 99214 OFFICE O/P EST MOD 30 MIN: CPT

## 2024-12-10 PROCEDURE — G2211 COMPLEX E/M VISIT ADD ON: CPT

## 2024-12-12 ENCOUNTER — APPOINTMENT (OUTPATIENT)
Dept: PAIN MANAGEMENT | Facility: CLINIC | Age: 79
End: 2024-12-12

## 2024-12-24 ENCOUNTER — APPOINTMENT (OUTPATIENT)
Dept: ORTHOPEDIC SURGERY | Facility: CLINIC | Age: 79
End: 2024-12-24
Payer: MEDICARE

## 2024-12-24 VITALS — WEIGHT: 205 LBS | BODY MASS INDEX: 31.07 KG/M2 | HEIGHT: 68 IN

## 2024-12-24 DIAGNOSIS — M54.16 RADICULOPATHY, LUMBAR REGION: ICD-10-CM

## 2024-12-24 PROCEDURE — 99213 OFFICE O/P EST LOW 20 MIN: CPT

## 2025-01-30 ENCOUNTER — APPOINTMENT (OUTPATIENT)
Dept: PAIN MANAGEMENT | Facility: CLINIC | Age: 80
End: 2025-01-30
Payer: MEDICARE

## 2025-01-30 VITALS — HEIGHT: 68 IN | BODY MASS INDEX: 31.07 KG/M2 | WEIGHT: 205 LBS

## 2025-01-30 DIAGNOSIS — M43.16 SPONDYLOLISTHESIS, LUMBAR REGION: ICD-10-CM

## 2025-01-30 DIAGNOSIS — M54.16 RADICULOPATHY, LUMBAR REGION: ICD-10-CM

## 2025-01-30 DIAGNOSIS — M47.816 SPONDYLOSIS W/OUT MYELOPATHY OR RADICULOPATHY, LUMBAR REGION: ICD-10-CM

## 2025-01-30 DIAGNOSIS — M48.062 SPINAL STENOSIS, LUMBAR REGION WITH NEUROGENIC CLAUDICATION: ICD-10-CM

## 2025-01-30 PROCEDURE — 99214 OFFICE O/P EST MOD 30 MIN: CPT

## 2025-02-14 ENCOUNTER — APPOINTMENT (OUTPATIENT)
Dept: PAIN MANAGEMENT | Facility: CLINIC | Age: 80
End: 2025-02-14
Payer: MEDICARE

## 2025-02-14 DIAGNOSIS — M54.16 RADICULOPATHY, LUMBAR REGION: ICD-10-CM

## 2025-02-14 PROCEDURE — 62323 NJX INTERLAMINAR LMBR/SAC: CPT

## 2025-03-14 ENCOUNTER — TRANSCRIPTION ENCOUNTER (OUTPATIENT)
Age: 80
End: 2025-03-14

## 2025-03-17 ENCOUNTER — TRANSCRIPTION ENCOUNTER (OUTPATIENT)
Age: 80
End: 2025-03-17

## 2025-04-03 ENCOUNTER — APPOINTMENT (OUTPATIENT)
Dept: PAIN MANAGEMENT | Facility: CLINIC | Age: 80
End: 2025-04-03
Payer: MEDICARE

## 2025-04-03 VITALS — HEIGHT: 68 IN | BODY MASS INDEX: 30.92 KG/M2 | WEIGHT: 204 LBS

## 2025-04-03 DIAGNOSIS — M48.062 SPINAL STENOSIS, LUMBAR REGION WITH NEUROGENIC CLAUDICATION: ICD-10-CM

## 2025-04-03 DIAGNOSIS — M47.816 SPONDYLOSIS W/OUT MYELOPATHY OR RADICULOPATHY, LUMBAR REGION: ICD-10-CM

## 2025-04-03 DIAGNOSIS — M43.16 SPONDYLOLISTHESIS, LUMBAR REGION: ICD-10-CM

## 2025-04-03 DIAGNOSIS — M54.16 RADICULOPATHY, LUMBAR REGION: ICD-10-CM

## 2025-04-03 PROCEDURE — 99214 OFFICE O/P EST MOD 30 MIN: CPT

## 2025-04-21 ENCOUNTER — APPOINTMENT (OUTPATIENT)
Dept: FAMILY MEDICINE | Facility: CLINIC | Age: 80
End: 2025-04-21
Payer: MEDICARE

## 2025-04-21 VITALS
DIASTOLIC BLOOD PRESSURE: 80 MMHG | SYSTOLIC BLOOD PRESSURE: 126 MMHG | WEIGHT: 207 LBS | HEART RATE: 82 BPM | BODY MASS INDEX: 31.37 KG/M2 | HEIGHT: 68 IN | TEMPERATURE: 97.2 F | OXYGEN SATURATION: 97 %

## 2025-04-21 DIAGNOSIS — R19.7 DIARRHEA, UNSPECIFIED: ICD-10-CM

## 2025-04-21 PROCEDURE — 99213 OFFICE O/P EST LOW 20 MIN: CPT

## 2025-04-21 PROCEDURE — G2211 COMPLEX E/M VISIT ADD ON: CPT

## 2025-04-22 ENCOUNTER — TRANSCRIPTION ENCOUNTER (OUTPATIENT)
Age: 80
End: 2025-04-22

## 2025-04-22 LAB
ALBUMIN SERPL ELPH-MCNC: 4.4 G/DL
ALP BLD-CCNC: 81 U/L
ALT SERPL-CCNC: 14 U/L
AMYLASE/CREAT SERPL: 56 U/L
ANION GAP SERPL CALC-SCNC: 14 MMOL/L
AST SERPL-CCNC: 18 U/L
BASOPHILS # BLD AUTO: 0.03 K/UL
BASOPHILS NFR BLD AUTO: 0.4 %
BILIRUB SERPL-MCNC: 0.4 MG/DL
BUN SERPL-MCNC: 19 MG/DL
CALCIUM SERPL-MCNC: 9.9 MG/DL
CHLORIDE SERPL-SCNC: 106 MMOL/L
CO2 SERPL-SCNC: 25 MMOL/L
CREAT SERPL-MCNC: 0.93 MG/DL
EGFRCR SERPLBLD CKD-EPI 2021: 63 ML/MIN/1.73M2
EOSINOPHIL # BLD AUTO: 0.21 K/UL
EOSINOPHIL NFR BLD AUTO: 2.8 %
ESTIMATED AVERAGE GLUCOSE: 114 MG/DL
GLUCOSE SERPL-MCNC: 94 MG/DL
HBA1C MFR BLD HPLC: 5.6 %
HCT VFR BLD CALC: 39.9 %
HGB BLD-MCNC: 12.7 G/DL
IMM GRANULOCYTES NFR BLD AUTO: 0.3 %
LPL SERPL-CCNC: 26 U/L
LYMPHOCYTES # BLD AUTO: 1.7 K/UL
LYMPHOCYTES NFR BLD AUTO: 23 %
MAN DIFF?: NORMAL
MCHC RBC-ENTMCNC: 31.8 G/DL
MCHC RBC-ENTMCNC: 32 PG
MCV RBC AUTO: 100.5 FL
MONOCYTES # BLD AUTO: 0.55 K/UL
MONOCYTES NFR BLD AUTO: 7.4 %
NEUTROPHILS # BLD AUTO: 4.89 K/UL
NEUTROPHILS NFR BLD AUTO: 66.1 %
PLATELET # BLD AUTO: 285 K/UL
POTASSIUM SERPL-SCNC: 5.1 MMOL/L
PROT SERPL-MCNC: 6.5 G/DL
RBC # BLD: 3.97 M/UL
RBC # FLD: 12.8 %
SODIUM SERPL-SCNC: 144 MMOL/L
TSH SERPL-ACNC: 1.11 UIU/ML
WBC # FLD AUTO: 7.4 K/UL

## 2025-06-06 ENCOUNTER — APPOINTMENT (OUTPATIENT)
Dept: PAIN MANAGEMENT | Facility: CLINIC | Age: 80
End: 2025-06-06
Payer: MEDICARE

## 2025-06-06 PROCEDURE — 64494 INJ PARAVERT F JNT L/S 2 LEV: CPT | Mod: LT,59

## 2025-06-06 PROCEDURE — J3490M: CUSTOM

## 2025-06-06 PROCEDURE — 64493 INJ PARAVERT F JNT L/S 1 LEV: CPT | Mod: LT

## 2025-06-18 ENCOUNTER — TRANSCRIPTION ENCOUNTER (OUTPATIENT)
Age: 80
End: 2025-06-18

## 2025-06-26 ENCOUNTER — APPOINTMENT (OUTPATIENT)
Dept: PAIN MANAGEMENT | Facility: CLINIC | Age: 80
End: 2025-06-26
Payer: MEDICARE

## 2025-06-26 VITALS — HEIGHT: 68 IN | BODY MASS INDEX: 31.22 KG/M2 | WEIGHT: 206 LBS

## 2025-06-26 PROCEDURE — 99214 OFFICE O/P EST MOD 30 MIN: CPT

## 2025-07-03 ENCOUNTER — APPOINTMENT (OUTPATIENT)
Dept: PAIN MANAGEMENT | Facility: CLINIC | Age: 80
End: 2025-07-03

## 2025-07-30 ENCOUNTER — APPOINTMENT (OUTPATIENT)
Dept: PAIN MANAGEMENT | Facility: CLINIC | Age: 80
End: 2025-07-30
Payer: MEDICARE

## 2025-07-30 PROCEDURE — 64493 INJ PARAVERT F JNT L/S 1 LEV: CPT | Mod: RT,59

## 2025-07-30 PROCEDURE — J3490M: CUSTOM

## 2025-07-30 PROCEDURE — 64494 INJ PARAVERT F JNT L/S 2 LEV: CPT | Mod: RT,59

## 2025-08-07 ENCOUNTER — APPOINTMENT (OUTPATIENT)
Dept: PAIN MANAGEMENT | Facility: CLINIC | Age: 80
End: 2025-08-07
Payer: MEDICARE

## 2025-08-07 VITALS — WEIGHT: 207 LBS | HEIGHT: 68 IN | BODY MASS INDEX: 31.37 KG/M2

## 2025-08-07 DIAGNOSIS — M47.816 SPONDYLOSIS W/OUT MYELOPATHY OR RADICULOPATHY, LUMBAR REGION: ICD-10-CM

## 2025-08-07 DIAGNOSIS — M48.062 SPINAL STENOSIS, LUMBAR REGION WITH NEUROGENIC CLAUDICATION: ICD-10-CM

## 2025-08-07 DIAGNOSIS — M43.16 SPONDYLOLISTHESIS, LUMBAR REGION: ICD-10-CM

## 2025-08-07 DIAGNOSIS — M54.16 RADICULOPATHY, LUMBAR REGION: ICD-10-CM

## 2025-08-07 PROCEDURE — 99214 OFFICE O/P EST MOD 30 MIN: CPT

## 2025-09-17 ENCOUNTER — APPOINTMENT (OUTPATIENT)
Dept: PAIN MANAGEMENT | Facility: CLINIC | Age: 80
End: 2025-09-17
Payer: MEDICARE

## 2025-09-17 DIAGNOSIS — M47.816 SPONDYLOSIS W/OUT MYELOPATHY OR RADICULOPATHY, LUMBAR REGION: ICD-10-CM

## 2025-09-17 PROCEDURE — 64636Z: CUSTOM | Mod: 50,59

## 2025-09-17 PROCEDURE — 64635 DESTROY LUMB/SAC FACET JNT: CPT | Mod: 50

## 2025-09-17 PROCEDURE — J3490M: CUSTOM

## 2025-09-19 ENCOUNTER — TRANSCRIPTION ENCOUNTER (OUTPATIENT)
Age: 80
End: 2025-09-19